# Patient Record
Sex: MALE | Race: WHITE | Employment: UNEMPLOYED | URBAN - METROPOLITAN AREA
[De-identification: names, ages, dates, MRNs, and addresses within clinical notes are randomized per-mention and may not be internally consistent; named-entity substitution may affect disease eponyms.]

---

## 2017-01-11 ENCOUNTER — TELEPHONE (OUTPATIENT)
Dept: FAMILY MEDICINE CLINIC | Facility: CLINIC | Age: 27
End: 2017-01-11

## 2017-01-11 NOTE — TELEPHONE ENCOUNTER
Pharmacy states Pt is requesting prior authorization to re-fill medication Xanax. Pt states cat spilled pills, while in the bathroom. Please advise.        Current outpatient prescriptions:   •  alprazolam 1 MG Oral Tab, Take one tablet (1mg) by mouth ever

## 2017-01-12 NOTE — TELEPHONE ENCOUNTER
Message noted and if they will not replace script then pt will have to wait until next refill. OK by me if pharmacy will allow for early refill.

## 2017-01-12 NOTE — TELEPHONE ENCOUNTER
Please advise on the patient request. [facility] does replace medications if they were stolen but there must be a police report to send to the insurance company.

## 2017-01-17 ENCOUNTER — OFFICE VISIT (OUTPATIENT)
Dept: FAMILY MEDICINE CLINIC | Facility: CLINIC | Age: 27
End: 2017-01-17

## 2017-01-17 ENCOUNTER — TELEPHONE (OUTPATIENT)
Dept: FAMILY MEDICINE CLINIC | Facility: CLINIC | Age: 27
End: 2017-01-17

## 2017-01-17 VITALS
HEIGHT: 76 IN | TEMPERATURE: 98 F | WEIGHT: 169 LBS | RESPIRATION RATE: 20 BRPM | DIASTOLIC BLOOD PRESSURE: 70 MMHG | BODY MASS INDEX: 20.58 KG/M2 | HEART RATE: 94 BPM | SYSTOLIC BLOOD PRESSURE: 106 MMHG

## 2017-01-17 DIAGNOSIS — M54.50 DORSALGIA OF LUMBAR REGION: ICD-10-CM

## 2017-01-17 DIAGNOSIS — S39.92XA BACK INJURY, INITIAL ENCOUNTER: ICD-10-CM

## 2017-01-17 PROCEDURE — 99213 OFFICE O/P EST LOW 20 MIN: CPT | Performed by: FAMILY MEDICINE

## 2017-01-17 PROCEDURE — 99212 OFFICE O/P EST SF 10 MIN: CPT | Performed by: FAMILY MEDICINE

## 2017-01-17 NOTE — PROGRESS NOTES
HPI:    Patient ID: Tuyet Richards is a 32year old male. HPI Comments: Pt presents with an injury to his right lower back after he slipped and fell. Pt states this has aggravated his previous back issues.  Pt had been seeing Dr Sowmya Mike for his previ Visit:  No prescriptions requested or ordered in this encounter    Imaging & Referrals:  MRI SPINE LUMBAR (CPT=[id number])       SE#[id number]

## 2017-01-17 NOTE — TELEPHONE ENCOUNTER
Pt's mother would like to discuss order for MRI. Pt has appt 02/10.  PT's mother stts there is suppose to be a 10 day approval?  Please advise

## 2017-01-19 ENCOUNTER — TELEPHONE (OUTPATIENT)
Dept: FAMILY MEDICINE CLINIC | Facility: CLINIC | Age: 27
End: 2017-01-19

## 2017-01-19 NOTE — TELEPHONE ENCOUNTER
Message noted and will forward question to managed care about window for MRI approval and also to see if this was approved by insurance

## 2017-01-19 NOTE — TELEPHONE ENCOUNTER
Pt's mom is asking if Pt can be prescribed more medication. Pt's mom  states he is taking about 8 tablets per day due to pain.  Please advise if ok per      Current outpatient prescriptions:   •  TRAMADOL HCL 50 MG Oral Tab, TAKE 1 TO 2 TABLETS BY MOUT

## 2017-01-19 NOTE — TELEPHONE ENCOUNTER
Noted waiting on approval for [facility] care pt notified.   No further action needed

## 2017-01-20 NOTE — TELEPHONE ENCOUNTER
Spoke with pt, he has not had medication refilled. Verified with pharmacy, they filled rx from 12/05 on 12/22, did not receive rx from 12 27. Rx called to pharmacy as written with 2 additional refills.

## 2017-01-20 NOTE — TELEPHONE ENCOUNTER
Did pt get both refills? Rx 12/27 was for 60 with 2 rf. LMTCB, may transfer to [address] [place] [address] today or FM triage.

## 2017-01-21 RX ORDER — TRAMADOL HYDROCHLORIDE 50 MG/1
TABLET ORAL
Qty: 60 TABLET | Refills: 2 | OUTPATIENT
Start: 2017-01-21

## 2017-01-21 NOTE — TELEPHONE ENCOUNTER
Per other 1/19 encounter; still had refill left on 12/27 rx for Tramadol. Please advise regarding pended refill request for cyclobenzaprine, as unable to refill per protocol.  LF=12/27/16 #30    Refill Protocol Appointment Criteria  · Appointment scheduled

## 2017-01-23 ENCOUNTER — TELEPHONE (OUTPATIENT)
Dept: FAMILY MEDICINE CLINIC | Facility: CLINIC | Age: 27
End: 2017-01-23

## 2017-01-23 RX ORDER — CYCLOBENZAPRINE HCL 10 MG
10 TABLET ORAL NIGHTLY
Qty: 30 TABLET | Refills: 0 | Status: SHIPPED | OUTPATIENT
Start: 2017-01-23 | End: 2017-02-03

## 2017-01-23 NOTE — TELEPHONE ENCOUNTER
Message noted: Chart reviewed and may refill medication as requested times one. Prescription sent to listed pharmacy. Please notify patient.

## 2017-01-23 NOTE — TELEPHONE ENCOUNTER
MRI SPINE LUMBAR  Is scheduled on 1/25 at [facility].   Please fax order to [address]: Rebeka Terry

## 2017-01-23 NOTE — TELEPHONE ENCOUNTER
Pt's mother has questions regarding the pt's Referral for MRI of back. She is questions location where pt. Should be getting test done?

## 2017-01-28 ENCOUNTER — TELEPHONE (OUTPATIENT)
Dept: FAMILY MEDICINE CLINIC | Facility: CLINIC | Age: 27
End: 2017-01-28

## 2017-01-28 DIAGNOSIS — M51.26 LUMBAR HERNIATED DISC: Primary | ICD-10-CM

## 2017-01-28 NOTE — TELEPHONE ENCOUNTER
MRI lumbar area from [facility] MRI shows some herniated disks on a few levels: Patient contacted. Discussed results and after discussion, to follow up with specialist as discussed;  Information provided and referral generated and copy of results, referra

## 2017-02-01 NOTE — TELEPHONE ENCOUNTER
Copy no longer available in clinic, sent to scan. Report requested from [facility] to be faxed to number below.

## 2017-02-01 NOTE — TELEPHONE ENCOUNTER
Pt mother is calling want to know if a copy of the result can fax to Dr Mary Rodriguez (neuro) [phone]

## 2017-02-02 ENCOUNTER — TELEPHONE (OUTPATIENT)
Dept: NEUROLOGY | Facility: CLINIC | Age: 27
End: 2017-02-02

## 2017-02-02 NOTE — TELEPHONE ENCOUNTER
Spoke to patient and informed him he will need to be re-evaluated in the office. LOV 9/13/16, PT order placed at that time. Patient never went to PT. Patient had Lumbar Spine MRI at [facility] and informed to bring the disc to the follow up appt.   P

## 2017-02-03 NOTE — TELEPHONE ENCOUNTER
Patient requesting refill for Cyclobenzaprine 10 mg nightly and Tramadol 50 mg every 6 hrs as needed for pain. Last refilled on 01/23/17 for 30 tablets / 0 refills ( for Cyclobenzaprine ) and 12/27/16 for 60 tablets / 2 refills ( for Tramadol ).  Please adv

## 2017-02-04 RX ORDER — CYCLOBENZAPRINE HCL 10 MG
TABLET ORAL
Qty: 30 TABLET | Refills: 0 | Status: SHIPPED | OUTPATIENT
Start: 2017-02-04 | End: 2017-07-03

## 2017-02-04 RX ORDER — TRAMADOL HYDROCHLORIDE 50 MG/1
TABLET ORAL
Qty: 60 TABLET | Refills: 2 | OUTPATIENT
Start: 2017-02-04 | End: 2017-02-06

## 2017-02-04 NOTE — TELEPHONE ENCOUNTER
Message noted. Chart reviewed. May refill medications as requested. Script reviewed and signed. Please call tramadol  into pharmacy as this is controlled substance and notify patient. Thanks.

## 2017-02-06 ENCOUNTER — TELEPHONE (OUTPATIENT)
Dept: FAMILY MEDICINE CLINIC | Facility: CLINIC | Age: 27
End: 2017-02-06

## 2017-02-06 RX ORDER — TRAMADOL HYDROCHLORIDE 50 MG/1
TABLET ORAL
Qty: 60 TABLET | Refills: 2 | OUTPATIENT
Start: 2017-02-06 | End: 2017-03-02

## 2017-02-06 NOTE — TELEPHONE ENCOUNTER
Message noted. Chart reviewed. Check to see if this already faxed/ called in. May refill medication as requested if not done. Script reviewed and signed. Please call into pharmacy as this is controlled substance and notify patient. Thanks.

## 2017-02-07 NOTE — TELEPHONE ENCOUNTER
Pamula Alpers form completed by Dr. Annelise Montgomery and ready for  in  of [facility].

## 2017-02-14 ENCOUNTER — OFFICE VISIT (OUTPATIENT)
Dept: NEUROLOGY | Facility: CLINIC | Age: 27
End: 2017-02-14

## 2017-02-14 ENCOUNTER — TELEPHONE (OUTPATIENT)
Dept: NEUROLOGY | Facility: CLINIC | Age: 27
End: 2017-02-14

## 2017-02-14 VITALS
BODY MASS INDEX: 21 KG/M2 | WEIGHT: 169 LBS | SYSTOLIC BLOOD PRESSURE: 128 MMHG | HEART RATE: 83 BPM | RESPIRATION RATE: 18 BRPM | DIASTOLIC BLOOD PRESSURE: 72 MMHG

## 2017-02-14 DIAGNOSIS — M51.26 LUMBAR HERNIATED DISC: ICD-10-CM

## 2017-02-14 DIAGNOSIS — M51.9 LUMBAR DISC DISEASE: ICD-10-CM

## 2017-02-14 DIAGNOSIS — G56.02 LEFT CARPAL TUNNEL SYNDROME: ICD-10-CM

## 2017-02-14 DIAGNOSIS — M54.16 LUMBAR RADICULOPATHY: Primary | ICD-10-CM

## 2017-02-14 DIAGNOSIS — G89.29 CHRONIC RIGHT-SIDED LOW BACK PAIN WITH RIGHT-SIDED SCIATICA: ICD-10-CM

## 2017-02-14 DIAGNOSIS — M54.41 CHRONIC RIGHT-SIDED LOW BACK PAIN WITH RIGHT-SIDED SCIATICA: ICD-10-CM

## 2017-02-14 PROCEDURE — 99214 OFFICE O/P EST MOD 30 MIN: CPT | Performed by: PHYSICAL MEDICINE & REHABILITATION

## 2017-02-14 NOTE — PROGRESS NOTES
Low Back Pain H & P    Chief Complaint: Patient presents with:  Low Back Pain: pt here for follow up with continued lower back pain that is worse at night after working all day.  low back pain radiates down the right leg with numbness and tingling in the ri History       Past Surgical History    ADENOIDECTOMY      TONSILLECTOMY      OTHER SURGICAL HISTORY      MUSCULOSKELETAL SURGERY UNLISTED      Comment Meniscus repair       Family History   Family History   Problem Relation Age of Onset   • Asthma Mother downward response   Reflexes: 2+ in bilateral lower extremities     Neural Tension Tests Lumbar Spine:  Sitting straight leg raise-RIGHT Positive for right posterior leg pain and at 70 degrees   Sitting straight leg raise-LEFT Negative for pain     Neurolo

## 2017-02-14 NOTE — PATIENT INSTRUCTIONS
Plan  He will drop off the CD of the MRI scan so that I can review it better. He will start PT on the lumbar spine. The patient will continue with their current pain medications.     He will get bilateral wrist cock up wrist splints to help with the

## 2017-02-17 ENCOUNTER — TELEPHONE (OUTPATIENT)
Dept: NEUROLOGY | Facility: CLINIC | Age: 27
End: 2017-02-17

## 2017-02-17 NOTE — TELEPHONE ENCOUNTER
Received disc of Lumbar Spine MRI done 1/25/17 at [address] [place] [address] report  Disc loaded to [facility] PACs and placed at the  for return to patient

## 2017-02-22 PROBLEM — M43.10 RETROLISTHESIS: Status: ACTIVE | Noted: 2017-02-22

## 2017-02-22 PROBLEM — M51.9 LUMBAR DISC DISEASE: Status: ACTIVE | Noted: 2017-02-22

## 2017-02-22 PROBLEM — M51.9 LUMBAR DISC DISEASE: Status: RESOLVED | Noted: 2017-02-14 | Resolved: 2017-02-22

## 2017-02-22 NOTE — TELEPHONE ENCOUNTER
He has a moderate herniated disc at L5-S1 on the right and he has some mild movement in his spine on extension views but this is probably insignificant. I would like the patient to call me after he has done 3 weeks of the PT.   If he is not doing better, t

## 2017-02-24 NOTE — TELEPHONE ENCOUNTER
Detailed message left on mobile phone with Dr Hughes Nurse recommendation and interpretation of MRI. Pt was asked to call after 3 week of physical therapy with update to progress. Pt told to call if any questions.

## 2017-03-03 NOTE — TELEPHONE ENCOUNTER
Pt's mom is asking to speak to nurse manager in regards situation. Pt's mom states pharmacy told her they had faxed request since last week.  Please advise

## 2017-03-03 NOTE — TELEPHONE ENCOUNTER
Spoke with pt and informed of 48-72 business hour refill policy and that Dr Oliverio Dunlap will not be in to address this until Monday (request came form pharmacy yesterday).  Pt reports this was requested a week ago by his mother os this is unacceptable--no such enco

## 2017-03-03 NOTE — TELEPHONE ENCOUNTER
Mother/Mayuri [phone] is calling for status of medication refill request. Per mother pt is out of medication.

## 2017-03-03 NOTE — TELEPHONE ENCOUNTER
Medication pending for review. If approved, please route to staff to call med in to pharmacy; or fax.     Last Rx: 2/6/17 #60 & 2-R ([facility]; informed last = 2/23/17 #60)    Recent Visits       Provider Department [facility]

## 2017-03-04 RX ORDER — TRAMADOL HYDROCHLORIDE 50 MG/1
TABLET ORAL
Qty: 60 TABLET | Refills: 2 | OUTPATIENT
Start: 2017-03-04 | End: 2017-03-21

## 2017-03-04 NOTE — TELEPHONE ENCOUNTER
Tramadol Rx phoned into the [facility] pharmacy pharmacist line.  Detailed VM left with Rx and instructions, Office number and Dr. Mariah Smith TOMASA#

## 2017-03-04 NOTE — TELEPHONE ENCOUNTER
Sarah eBth Anderson for Pae: Spoke to mother who is upset. Patient is completely out of medication for Tramadol. Patient mother asked if you can please give one refill or a short supply if possible.      Patient c/o LUMBAR spine; LOV with DR Cummins 2/14/17;  SNOW campuzano

## 2017-03-04 NOTE — TELEPHONE ENCOUNTER
Pt contacted and informed that the Tramadol Rx was phoned in to the pharmacy  this morning. Pt verbalized understanding and denies any further questions or concerns at present.

## 2017-03-09 RX ORDER — ALPRAZOLAM 1 MG/1
TABLET ORAL
Qty: 60 TABLET | Refills: 1 | Status: SHIPPED
Start: 2017-03-09 | End: 2017-04-10

## 2017-03-09 NOTE — TELEPHONE ENCOUNTER
Pt notified that his Rx were fax to [facility] [place]. Delano Mew.   No further action needed

## 2017-03-10 ENCOUNTER — APPOINTMENT (OUTPATIENT)
Dept: PHYSICAL THERAPY | Facility: HOSPITAL | Age: 27
End: 2017-03-10
Attending: PHYSICAL MEDICINE & REHABILITATION
Payer: MEDICAID

## 2017-03-17 ENCOUNTER — OFFICE VISIT (OUTPATIENT)
Dept: PHYSICAL THERAPY | Facility: HOSPITAL | Age: 27
End: 2017-03-17
Attending: PHYSICAL MEDICINE & REHABILITATION
Payer: MEDICAID

## 2017-03-17 DIAGNOSIS — M54.41 CHRONIC RIGHT-SIDED LOW BACK PAIN WITH RIGHT-SIDED SCIATICA: ICD-10-CM

## 2017-03-17 DIAGNOSIS — M51.9 LUMBAR DISC DISEASE: ICD-10-CM

## 2017-03-17 DIAGNOSIS — M54.16 LUMBAR RADICULOPATHY: Primary | ICD-10-CM

## 2017-03-17 DIAGNOSIS — G89.29 CHRONIC RIGHT-SIDED LOW BACK PAIN WITH RIGHT-SIDED SCIATICA: ICD-10-CM

## 2017-03-17 DIAGNOSIS — M51.26 LUMBAR HERNIATED DISC: ICD-10-CM

## 2017-03-17 PROCEDURE — 97110 THERAPEUTIC EXERCISES: CPT

## 2017-03-17 PROCEDURE — 97162 PT EVAL MOD COMPLEX 30 MIN: CPT

## 2017-03-17 NOTE — PROGRESS NOTES
LUMBAR SPINE EVALUATION:   Referring Physician: Dr. Viann Meckel  Date of Onset: 2/14/2017 Date of Service: 3/17/2017   Lumbar Radiculopathy (M54.16); R LBP with sciatica (M54.41; G89.29); herniated disc (M51.26); disc disease (M51.9)     PATIENT SUMMARY:   Mi activities (skateboarding, riding bike). Pt goals include to relieve pain and return to work and exercise without difficulty. Past medical history was reviewed with Asa Corona. Significant findings include: B meniscectomy and repair (~ 8 years ago).  Cigar test: (+) for Adverse Neural Tension with neck flexion provoking symptoms at R lumbar; R buttock and R posterior thigh; Symptoms resolved with neck extension. Repeated Movement Testin.  Repeated lumbar flexion in standing: Increased R lumbar; Produc prophylactic care. 2. Patient will have lumbar ROM WFL to ease bending and lifting to meet occupational demands as  without difficulty. 3. Patient will have 5/5 LE strength to squat and lift at least 40 lbs. Without difficulty.   4. Patient wi

## 2017-03-22 RX ORDER — TRAMADOL HYDROCHLORIDE 50 MG/1
TABLET ORAL
Qty: 60 TABLET | Refills: 2 | Status: SHIPPED
Start: 2017-03-22 | End: 2017-04-10

## 2017-03-24 ENCOUNTER — OFFICE VISIT (OUTPATIENT)
Dept: PHYSICAL THERAPY | Facility: HOSPITAL | Age: 27
End: 2017-03-24
Attending: PHYSICAL MEDICINE & REHABILITATION
Payer: MEDICAID

## 2017-03-24 DIAGNOSIS — M54.16 LUMBAR RADICULOPATHY: Primary | ICD-10-CM

## 2017-03-24 DIAGNOSIS — M54.41 CHRONIC RIGHT-SIDED LOW BACK PAIN WITH RIGHT-SIDED SCIATICA: ICD-10-CM

## 2017-03-24 DIAGNOSIS — M51.26 LUMBAR HERNIATED DISC: ICD-10-CM

## 2017-03-24 DIAGNOSIS — M51.9 LUMBAR DISC DISEASE: ICD-10-CM

## 2017-03-24 DIAGNOSIS — G89.29 CHRONIC RIGHT-SIDED LOW BACK PAIN WITH RIGHT-SIDED SCIATICA: ICD-10-CM

## 2017-03-24 PROCEDURE — 97112 NEUROMUSCULAR REEDUCATION: CPT

## 2017-03-24 PROCEDURE — 97110 THERAPEUTIC EXERCISES: CPT

## 2017-03-24 PROCEDURE — 97140 MANUAL THERAPY 1/> REGIONS: CPT

## 2017-03-24 NOTE — PROGRESS NOTES
Dx: Lumbar Radiculopathy (M54.16); R LBP with sciatica (M54.41; G89.29); herniated disc (M51.26); disc disease (M51.9)         Authorized # of Visits:  2/7         Next MD visit: none scheduled  Fall Risk: standard         Precautions: [address]  without difficulty. 3. Patient will have 5/5 LE strength to squat and lift at least 40 lbs. Without difficulty. 4. Patient will have improved neurodynamics to look down and look up with reaching overhead without increased symptoms.   5. Shireene

## 2017-03-31 ENCOUNTER — OFFICE VISIT (OUTPATIENT)
Dept: PHYSICAL THERAPY | Facility: HOSPITAL | Age: 27
End: 2017-03-31
Attending: PHYSICAL MEDICINE & REHABILITATION
Payer: MEDICAID

## 2017-03-31 DIAGNOSIS — G89.29 CHRONIC RIGHT-SIDED LOW BACK PAIN WITH RIGHT-SIDED SCIATICA: ICD-10-CM

## 2017-03-31 DIAGNOSIS — M51.9 LUMBAR DISC DISEASE: ICD-10-CM

## 2017-03-31 DIAGNOSIS — M51.26 LUMBAR HERNIATED DISC: ICD-10-CM

## 2017-03-31 DIAGNOSIS — M54.16 LUMBAR RADICULOPATHY: Primary | ICD-10-CM

## 2017-03-31 DIAGNOSIS — M54.41 CHRONIC RIGHT-SIDED LOW BACK PAIN WITH RIGHT-SIDED SCIATICA: ICD-10-CM

## 2017-03-31 PROCEDURE — 97140 MANUAL THERAPY 1/> REGIONS: CPT

## 2017-03-31 PROCEDURE — 97110 THERAPEUTIC EXERCISES: CPT

## 2017-03-31 PROCEDURE — 97112 NEUROMUSCULAR REEDUCATION: CPT

## 2017-03-31 NOTE — PROGRESS NOTES
Dx: Lumbar Radiculopathy (M54.16); R LBP with sciatica (M54.41; G89.29); herniated disc (M51.26); disc disease (M51.9)         Authorized # of Visits:  2/7         Next MD visit: none scheduled  Fall Risk: standard         Precautions: [address] without provocation of radicular symptoms. R hip pain was produced with end range lumbar flexion in supine. R posterior thigh pain produced with end range flex in standing. No c/o pain post-tx. Goals:     Goals:  1.  Patient will be independent with HEP

## 2017-04-05 ENCOUNTER — TELEPHONE (OUTPATIENT)
Dept: ORTHOPEDICS CLINIC | Facility: CLINIC | Age: 27
End: 2017-04-05

## 2017-04-07 ENCOUNTER — OFFICE VISIT (OUTPATIENT)
Dept: PHYSICAL THERAPY | Facility: HOSPITAL | Age: 27
End: 2017-04-07
Attending: PHYSICAL MEDICINE & REHABILITATION
Payer: MEDICAID

## 2017-04-07 DIAGNOSIS — M51.9 LUMBAR DISC DISEASE: ICD-10-CM

## 2017-04-07 DIAGNOSIS — M54.16 LUMBAR RADICULOPATHY: Primary | ICD-10-CM

## 2017-04-07 DIAGNOSIS — G89.29 CHRONIC RIGHT-SIDED LOW BACK PAIN WITH RIGHT-SIDED SCIATICA: ICD-10-CM

## 2017-04-07 DIAGNOSIS — M54.41 CHRONIC RIGHT-SIDED LOW BACK PAIN WITH RIGHT-SIDED SCIATICA: ICD-10-CM

## 2017-04-07 DIAGNOSIS — M51.26 LUMBAR HERNIATED DISC: ICD-10-CM

## 2017-04-07 PROCEDURE — 97110 THERAPEUTIC EXERCISES: CPT

## 2017-04-07 PROCEDURE — 97112 NEUROMUSCULAR REEDUCATION: CPT

## 2017-04-07 PROCEDURE — 97140 MANUAL THERAPY 1/> REGIONS: CPT

## 2017-04-07 NOTE — PROGRESS NOTES
Dx: Lumbar Radiculopathy (M54.16); R LBP with sciatica (M54.41; G89.29); herniated disc (M51.26); disc disease (M51.9)         Authorized # of Visits:  4/7         Next MD visit: none scheduled  Fall Risk: standard         Precautions: [address] Supine: FMP to improve lumbar flex mobility  - Sitting: FMP to improve spinal flexion mobility x10 min  - Supine: FMP to improve lumbar flex mobility  - Supine: MFR B psoas x10 min  - Supine: FMP to improve lumbar flex mobility  - Supine: MFR B psoas  - Si

## 2017-04-11 RX ORDER — TRAMADOL HYDROCHLORIDE 50 MG/1
TABLET ORAL
Qty: 60 TABLET | Refills: 2 | Status: SHIPPED
Start: 2017-04-11 | End: 2017-05-03

## 2017-04-11 RX ORDER — ALPRAZOLAM 1 MG/1
TABLET ORAL
Qty: 60 TABLET | Refills: 2 | Status: SHIPPED
Start: 2017-04-11 | End: 2017-07-15

## 2017-04-11 NOTE — TELEPHONE ENCOUNTER
Message noted. Chart reviewed. May refill medications as requested. Script reviewed and signed/ printed. Will have nurse fax into pharmacy as this is controlled substance and notify patient.  No change in dosage and CPM. Appointment to discuss treatment if

## 2017-04-11 NOTE — TELEPHONE ENCOUNTER
Pt Rx  For Tramadol were faxed over to [facility] . Message left on pt voicemail.   No further action needed

## 2017-04-11 NOTE — TELEPHONE ENCOUNTER
Pt's mom called in to follow up on refill request.  Pt's mom states pt will be out of one of the medications on Thursday. Pt's mom states pt wants to know if he can be prescribed ALPRAZOLAM at 2mg with 30 pills?   Pt's mom states pt is asking so he can jazzmine

## 2017-04-14 ENCOUNTER — OFFICE VISIT (OUTPATIENT)
Dept: PHYSICAL THERAPY | Facility: HOSPITAL | Age: 27
End: 2017-04-14
Attending: PHYSICAL MEDICINE & REHABILITATION
Payer: MEDICAID

## 2017-04-14 DIAGNOSIS — M54.16 LUMBAR RADICULOPATHY: Primary | ICD-10-CM

## 2017-04-14 DIAGNOSIS — M51.26 LUMBAR HERNIATED DISC: ICD-10-CM

## 2017-04-14 DIAGNOSIS — M54.41 CHRONIC RIGHT-SIDED LOW BACK PAIN WITH RIGHT-SIDED SCIATICA: ICD-10-CM

## 2017-04-14 DIAGNOSIS — M51.9 LUMBAR DISC DISEASE: ICD-10-CM

## 2017-04-14 DIAGNOSIS — G89.29 CHRONIC RIGHT-SIDED LOW BACK PAIN WITH RIGHT-SIDED SCIATICA: ICD-10-CM

## 2017-04-14 PROCEDURE — 97110 THERAPEUTIC EXERCISES: CPT

## 2017-04-14 PROCEDURE — 97140 MANUAL THERAPY 1/> REGIONS: CPT

## 2017-04-14 NOTE — PROGRESS NOTES
Dx: Lumbar Radiculopathy (M54.16); R LBP with sciatica (M54.41; G89.29); herniated disc (M51.26); disc disease (M51.9)         Authorized # of Visits:  5/7         Next MD visit: none scheduled  Fall Risk: standard         Precautions: [address] MFR B psoas  - Sidelying: lumbosacral manip at R  - Thoracic manip  - Sitting: FMP to improve lumbar flexion   Neuro Re-ed X 15 min  - Supine: Diaphragmatic MFR and activation  - Supine: Diphragmatic breathing      Assessment: Following manual therapy, pt

## 2017-04-21 ENCOUNTER — OFFICE VISIT (OUTPATIENT)
Dept: PHYSICAL THERAPY | Facility: HOSPITAL | Age: 27
End: 2017-04-21
Attending: PHYSICAL MEDICINE & REHABILITATION
Payer: MEDICAID

## 2017-04-21 DIAGNOSIS — M54.41 CHRONIC RIGHT-SIDED LOW BACK PAIN WITH RIGHT-SIDED SCIATICA: ICD-10-CM

## 2017-04-21 DIAGNOSIS — M54.16 LUMBAR RADICULOPATHY: Primary | ICD-10-CM

## 2017-04-21 DIAGNOSIS — M51.26 LUMBAR HERNIATED DISC: ICD-10-CM

## 2017-04-21 DIAGNOSIS — G89.29 CHRONIC RIGHT-SIDED LOW BACK PAIN WITH RIGHT-SIDED SCIATICA: ICD-10-CM

## 2017-04-21 DIAGNOSIS — M51.9 LUMBAR DISC DISEASE: ICD-10-CM

## 2017-04-21 PROCEDURE — 97110 THERAPEUTIC EXERCISES: CPT

## 2017-04-21 PROCEDURE — 97140 MANUAL THERAPY 1/> REGIONS: CPT

## 2017-04-21 NOTE — PROGRESS NOTES
Dx: Lumbar Radiculopathy (M54.16); R LBP with sciatica (M54.41; G89.29); herniated disc (M51.26); disc disease (M51.9)         Authorized # of Visits:  6/7         Next MD visit: none scheduled  Fall Risk: standard         Precautions: [address] followed by 2-3 lumbar ext; Added standing: iliopsoas lift   Therapeutic Exercise X 20 min  - Repeated lumbar ext in prone: 10x1  - Repeated lumbar ext in standing: 10x1  - Supine: Sciatic n.  Neuromobilization; supine and sitting; 10x1, each position  - Mitchell better to repeated lumbar flexion to reduce symptoms and increase lumbar ROM. Therefore, HEP was modified to repeated lumbar flexion in sitting 10x every 2-3 hours, followed by lumbar ext in standing x3. He was agreeable. Goals:     Goals:  1.  Patient

## 2017-04-28 ENCOUNTER — OFFICE VISIT (OUTPATIENT)
Dept: PHYSICAL THERAPY | Facility: HOSPITAL | Age: 27
End: 2017-04-28
Attending: PHYSICAL MEDICINE & REHABILITATION
Payer: MEDICAID

## 2017-04-28 PROCEDURE — 97110 THERAPEUTIC EXERCISES: CPT

## 2017-04-28 PROCEDURE — 97140 MANUAL THERAPY 1/> REGIONS: CPT

## 2017-05-03 RX ORDER — TRAMADOL HYDROCHLORIDE 50 MG/1
TABLET ORAL
Qty: 60 TABLET | Refills: 2 | Status: SHIPPED
Start: 2017-05-03 | End: 2017-05-22

## 2017-05-04 NOTE — TELEPHONE ENCOUNTER
[facility] requesting to speak to nurse to clarify rx. Idalia Bowles states pt has refilled this med every few days, and is upset to be out of meds. Please advise, requesting clarification.

## 2017-05-05 ENCOUNTER — APPOINTMENT (OUTPATIENT)
Dept: PHYSICAL THERAPY | Facility: HOSPITAL | Age: 27
End: 2017-05-05
Attending: PHYSICAL MEDICINE & REHABILITATION
Payer: MEDICAID

## 2017-05-12 ENCOUNTER — OFFICE VISIT (OUTPATIENT)
Dept: PHYSICAL THERAPY | Facility: HOSPITAL | Age: 27
End: 2017-05-12
Attending: PHYSICAL MEDICINE & REHABILITATION
Payer: MEDICAID

## 2017-05-12 PROCEDURE — 97140 MANUAL THERAPY 1/> REGIONS: CPT

## 2017-05-12 PROCEDURE — 97110 THERAPEUTIC EXERCISES: CPT

## 2017-05-12 NOTE — PROGRESS NOTES
Patient Name: Maria Del Rosario Thakkar  YOB: 1990          MRN number:  [id number]  Date:  4/28/2017  Referring Physician:  Jalil Dey  Dx: Lumbar Radiculopathy (M54.16); R LBP with sciatica (M54.41; G89.29); herniated disc (M51.26); disc dise lumbosacral manip at R  - Sitting: FMP to improve lumbar flexion x10 min  - Supine: FMP to improve lumbar flex mobility  - Sidelying: lumbosacral manip at R  - Sitting: FMP to improve lumbar flexion  - Supine: mid-thoracic manip   Neuro Re-ed         Assnenita

## 2017-05-19 ENCOUNTER — OFFICE VISIT (OUTPATIENT)
Dept: PHYSICAL THERAPY | Facility: HOSPITAL | Age: 27
End: 2017-05-19
Attending: PHYSICAL MEDICINE & REHABILITATION
Payer: MEDICAID

## 2017-05-19 PROCEDURE — 97110 THERAPEUTIC EXERCISES: CPT

## 2017-05-19 PROCEDURE — 97140 MANUAL THERAPY 1/> REGIONS: CPT

## 2017-05-22 RX ORDER — TRAMADOL HYDROCHLORIDE 50 MG/1
TABLET ORAL
Qty: 60 TABLET | Refills: 2 | Status: SHIPPED
Start: 2017-05-22 | End: 2017-06-13

## 2017-05-26 ENCOUNTER — OFFICE VISIT (OUTPATIENT)
Dept: PHYSICAL THERAPY | Facility: HOSPITAL | Age: 27
End: 2017-05-26
Attending: PHYSICAL MEDICINE & REHABILITATION
Payer: MEDICAID

## 2017-05-26 PROCEDURE — 97140 MANUAL THERAPY 1/> REGIONS: CPT

## 2017-05-26 PROCEDURE — 97110 THERAPEUTIC EXERCISES: CPT

## 2017-05-26 NOTE — PROGRESS NOTES
Patient Name: Chun Elizabeth  YOB: 1990          MRN number:  [id number]  Date:  4/28/2017  Referring Physician:  Saundra Rodriguez  Dx: Lumbar Radiculopathy (M54.16); R LBP with sciatica (M54.41; G89.29); herniated disc (M51.26); disc dise FMP to improve lumbar flexion  - Prone: mid-thoracic P/A mobilization x15 minutes  - Sidelying SIJ manip at R  - Supine: FMP to improve lumbar flex mobility  - Sitting: FMP to improve lumbar flexion  - Prone: mid-thoracic/lumbar P/A mobilization   Neuro Re

## 2017-06-02 ENCOUNTER — APPOINTMENT (OUTPATIENT)
Dept: PHYSICAL THERAPY | Facility: HOSPITAL | Age: 27
End: 2017-06-02
Payer: MEDICAID

## 2017-06-08 ENCOUNTER — TELEPHONE (OUTPATIENT)
Dept: NEUROLOGY | Facility: CLINIC | Age: 27
End: 2017-06-08

## 2017-06-09 ENCOUNTER — OFFICE VISIT (OUTPATIENT)
Dept: PHYSICAL THERAPY | Facility: HOSPITAL | Age: 27
End: 2017-06-09
Attending: PHYSICAL MEDICINE & REHABILITATION
Payer: MEDICAID

## 2017-06-09 PROCEDURE — 97140 MANUAL THERAPY 1/> REGIONS: CPT

## 2017-06-09 PROCEDURE — 97110 THERAPEUTIC EXERCISES: CPT

## 2017-06-13 ENCOUNTER — TELEPHONE (OUTPATIENT)
Dept: FAMILY MEDICINE CLINIC | Facility: CLINIC | Age: 27
End: 2017-06-13

## 2017-06-13 RX ORDER — TRAMADOL HYDROCHLORIDE 50 MG/1
TABLET ORAL
Qty: 60 TABLET | Refills: 2 | Status: SHIPPED
Start: 2017-06-13 | End: 2017-06-27

## 2017-06-13 NOTE — TELEPHONE ENCOUNTER
Pt mother calling regarding refill request for TRAMADOL HCL 50 MG Oral Tab. .. please advise       Current Outpatient Prescriptions:  TRAMADOL HCL 50 MG Oral Tab TAKE 1 TO 2 TABLETS BY MOUTH EVERY 6 HOURS AS NEEDED FOR PAIN Disp: 60 tablet Rfl: 2

## 2017-06-13 NOTE — TELEPHONE ENCOUNTER
Message noted. Chart reviewed. May refill medication as requested. Script reviewed and signed/ printed. Given to nurse fax into pharmacy as this is controlled substance and notify patient.

## 2017-06-16 ENCOUNTER — APPOINTMENT (OUTPATIENT)
Dept: PHYSICAL THERAPY | Facility: HOSPITAL | Age: 27
End: 2017-06-16
Payer: MEDICAID

## 2017-06-23 ENCOUNTER — OFFICE VISIT (OUTPATIENT)
Dept: PHYSICAL THERAPY | Facility: HOSPITAL | Age: 27
End: 2017-06-23
Attending: PHYSICAL MEDICINE & REHABILITATION
Payer: MEDICAID

## 2017-06-23 PROCEDURE — 97140 MANUAL THERAPY 1/> REGIONS: CPT

## 2017-06-23 PROCEDURE — 97110 THERAPEUTIC EXERCISES: CPT

## 2017-06-23 NOTE — PROGRESS NOTES
Patient Name: Yulia Prabhakar  YOB: 1990          MRN number:  [id number]  Date:  6/23/2017  Referring Physician:  Gume Snow  Dx: Lumbar Radiculopathy (M54.16); R LBP with sciatica (M54.41; G89.29); herniated disc (M51.26); disc dis L=5/5   L3 - Knee Ext  R=3+/5, L=5/5  R=5/5, L=5/5   L4 - Ankle DF  R=4/5, L=5/5  R=5/5, L=5/5   L5 - EHL  R=5/5, L=5/5   R=5/5, L=5/5   S1 - Ankle PF  R=5/5, L=5/5   R=5/5, L=5/5     Hip Strength: 6/23/2017  Psoas: R= 4-/5, L=4/5  Post glut met: R =5/5, L treatment options and has agreed to actively participate in planning and for this course of care. Thank you for your referral. Please co-sign or sign and return this letter via fax as soon as possible to [phone].  If you have any questions, please c

## 2017-06-27 ENCOUNTER — TELEPHONE (OUTPATIENT)
Dept: FAMILY MEDICINE CLINIC | Facility: CLINIC | Age: 27
End: 2017-06-27

## 2017-06-27 RX ORDER — TRAMADOL HYDROCHLORIDE 50 MG/1
TABLET ORAL
Qty: 60 TABLET | Refills: 2 | OUTPATIENT
Start: 2017-06-27 | End: 2017-07-03

## 2017-06-27 NOTE — TELEPHONE ENCOUNTER
Patient called and stated need a new refill request  Patient is currently out of medication-      Current Outpatient Prescriptions:  TraMADol HCl 50 MG Oral Tab TAKE 1 TABLET BY MOUTH EVERY 6 HOURS AS NEEDED FOR PAIN Disp: 60 tablet Rfl: 2     [facility]

## 2017-06-27 NOTE — TELEPHONE ENCOUNTER
Mother is following up on medication advised to have pt call us back regarding direction of medication. ..,please advise

## 2017-06-27 NOTE — TELEPHONE ENCOUNTER
Pt was inform of  message below and he verbalized understanding. Pharmacy was called and given the new tramadol sig.

## 2017-06-27 NOTE — TELEPHONE ENCOUNTER
Mother states that the script for the tramadol medication that was sent to the pharmacy was written wrong. Per mother the script was written for 60 pills which would only last the pt 15 days.  Mother states that pt takes 2 pills every 6 hours not 1 like it

## 2017-06-27 NOTE — TELEPHONE ENCOUNTER
Message noted. May change sig to 1-2 tablets every 6 hours as needed for pain. May call into pharmacy. Encouraged patient to take 1 tablet.

## 2017-06-27 NOTE — TELEPHONE ENCOUNTER
Pt calling checking status of change in rx, pt states quantity for tramadol was incorrect, pt states rx should have been for 1 week but stated two. Pt states he is out of  meds now, please advise.

## 2017-06-27 NOTE — TELEPHONE ENCOUNTER
Pt called back again and spoke with nurse. States rx should be for 2 pills q 6 hrs and pharmacy will not release refill to him. NP please advise if you wish to change sig. Pended for signature.

## 2017-06-30 ENCOUNTER — APPOINTMENT (OUTPATIENT)
Dept: PHYSICAL THERAPY | Facility: HOSPITAL | Age: 27
End: 2017-06-30
Payer: MEDICAID

## 2017-07-03 ENCOUNTER — OFFICE VISIT (OUTPATIENT)
Dept: FAMILY MEDICINE CLINIC | Facility: CLINIC | Age: 27
End: 2017-07-03

## 2017-07-03 VITALS
DIASTOLIC BLOOD PRESSURE: 71 MMHG | SYSTOLIC BLOOD PRESSURE: 114 MMHG | HEIGHT: 76 IN | TEMPERATURE: 98 F | WEIGHT: 164 LBS | BODY MASS INDEX: 19.97 KG/M2 | HEART RATE: 91 BPM

## 2017-07-03 DIAGNOSIS — M54.50 DORSALGIA OF LUMBAR REGION: ICD-10-CM

## 2017-07-03 PROCEDURE — 99212 OFFICE O/P EST SF 10 MIN: CPT | Performed by: FAMILY MEDICINE

## 2017-07-03 PROCEDURE — 99213 OFFICE O/P EST LOW 20 MIN: CPT | Performed by: FAMILY MEDICINE

## 2017-07-03 RX ORDER — TRAMADOL HYDROCHLORIDE 50 MG/1
TABLET ORAL
Qty: 120 TABLET | Refills: 0 | Status: SHIPPED | OUTPATIENT
Start: 2017-07-03 | End: 2017-07-28

## 2017-07-03 NOTE — PROGRESS NOTES
HPI:    Patient ID: Beba Richards is a 32year old male. Patient is here for follow up for chronic medical issues- chronic back pain. The patient is compliant with medications and no side effects.  There are no acute issues and patient is requesti

## 2017-07-17 RX ORDER — ALPRAZOLAM 1 MG/1
TABLET ORAL
Qty: 60 TABLET | Refills: 2 | Status: SHIPPED
Start: 2017-07-17 | End: 2017-07-18

## 2017-07-18 RX ORDER — ALPRAZOLAM 1 MG/1
TABLET ORAL
Qty: 60 TABLET | Refills: 2 | Status: SHIPPED
Start: 2017-07-18 | End: 2017-10-11

## 2017-07-27 NOTE — TELEPHONE ENCOUNTER
Mom calling on status of rxs for pt      also requesting refill for Tramadol  Needs by Tuesday    Pt can be reached at ph# [phone]

## 2017-07-31 RX ORDER — TRAMADOL HYDROCHLORIDE 50 MG/1
TABLET ORAL
Qty: 60 TABLET | Refills: 2 | OUTPATIENT
Start: 2017-07-31 | End: 2017-08-02

## 2017-07-31 RX ORDER — CYCLOBENZAPRINE HCL 10 MG
TABLET ORAL
Qty: 30 TABLET | Refills: 0 | Status: SHIPPED | OUTPATIENT
Start: 2017-07-31 | End: 2017-09-24

## 2017-08-01 RX ORDER — TRAMADOL HYDROCHLORIDE 50 MG/1
TABLET ORAL
Qty: 60 TABLET | Refills: 2 | OUTPATIENT
Start: 2017-08-01

## 2017-08-01 NOTE — TELEPHONE ENCOUNTER
Tramadol called into the [facility] pharmacy in [address]  as ordered on 7/31/1/7.

## 2017-08-01 NOTE — TELEPHONE ENCOUNTER
CYCLOBENZAPRINE HCL 10 MG Oral Tab called into the [facility] pharmacy in [address] as ordered on 7/31/17.

## 2017-08-02 RX ORDER — TRAMADOL HYDROCHLORIDE 50 MG/1
TABLET ORAL
Qty: 60 TABLET | Refills: 2 | Status: SHIPPED
Start: 2017-08-02 | End: 2017-08-30

## 2017-08-04 NOTE — TELEPHONE ENCOUNTER
Verified w/ pharmacy Rx for Tramadol was already called in.  Cyclobenazprine was not called into, called in w/ pharmacist.

## 2017-08-31 RX ORDER — TRAMADOL HYDROCHLORIDE 50 MG/1
TABLET ORAL
Qty: 60 TABLET | Refills: 2 | Status: SHIPPED
Start: 2017-08-31 | End: 2017-09-19

## 2017-09-20 RX ORDER — TRAMADOL HYDROCHLORIDE 50 MG/1
TABLET ORAL
Qty: 60 TABLET | Refills: 2 | Status: SHIPPED
Start: 2017-09-20 | End: 2017-10-10

## 2017-09-25 RX ORDER — CYCLOBENZAPRINE HCL 10 MG
TABLET ORAL
Qty: 30 TABLET | Refills: 0 | Status: SHIPPED | OUTPATIENT
Start: 2017-09-25 | End: 2017-10-23

## 2017-09-25 NOTE — TELEPHONE ENCOUNTER
Message noted: Chart reviewed and may refill medication as requested. Prescription sent to listed pharmacy. Pharmacy to notify patient.

## 2017-09-27 ENCOUNTER — TELEPHONE (OUTPATIENT)
Dept: OTHER | Age: 27
End: 2017-09-27

## 2017-09-27 NOTE — TELEPHONE ENCOUNTER
Can follow up once insurance is settled but will need to find a new provider in [facility] until then.

## 2017-09-27 NOTE — TELEPHONE ENCOUNTER
LMB. Transfer to [phone]. Dr. Karma Rosas, this patient is [facility] and we won't be able to make appt for him after Oct 1. Please advise.

## 2017-09-27 NOTE — TELEPHONE ENCOUNTER
Spoke with [facility] pharmacist about concerns. Will have office contact pt to make follow up appointment to reevaluate need for medications soon.

## 2017-09-27 NOTE — TELEPHONE ENCOUNTER
Osborn Duverney [facility] pharmacist would like a call back from Dr Joyce Roblero only. States that has some concerns about patient's mother and patient's tramadol. Thinks mother might be abusing patient's medication.

## 2017-10-11 RX ORDER — TRAMADOL HYDROCHLORIDE 50 MG/1
TABLET ORAL
Qty: 60 TABLET | Refills: 2 | OUTPATIENT
Start: 2017-10-11 | End: 2017-10-11

## 2017-10-11 RX ORDER — TRAMADOL HYDROCHLORIDE 50 MG/1
TABLET ORAL
Qty: 60 TABLET | Refills: 2 | Status: SHIPPED
Start: 2017-10-11 | End: 2017-11-07

## 2017-10-12 RX ORDER — ALPRAZOLAM 1 MG/1
TABLET ORAL
Qty: 60 TABLET | Refills: 2 | Status: SHIPPED
Start: 2017-10-12 | End: 2018-01-02

## 2017-10-17 RX ORDER — TRAMADOL HYDROCHLORIDE 50 MG/1
TABLET ORAL
Qty: 60 TABLET | Refills: 2 | OUTPATIENT
Start: 2017-10-17

## 2017-10-24 RX ORDER — CYCLOBENZAPRINE HCL 10 MG
TABLET ORAL
Qty: 30 TABLET | Refills: 0 | Status: SHIPPED | OUTPATIENT
Start: 2017-10-24 | End: 2017-11-19

## 2017-11-07 NOTE — TELEPHONE ENCOUNTER
Please advise on refill request.    Recent Outpatient Visits            4 months ago Dorsalgia of lumbar region    Blanca Medina MD    Office Visit    4 months ago     [facility]

## 2017-11-08 RX ORDER — TRAMADOL HYDROCHLORIDE 50 MG/1
TABLET ORAL
Qty: 60 TABLET | Refills: 0 | Status: SHIPPED
Start: 2017-11-08 | End: 2017-11-21

## 2017-11-08 NOTE — TELEPHONE ENCOUNTER
[facility] [place] [address] [place], [address] ~ [phone]                Discharge Summary   5/12/2017    Zach Desai           Admission Information        Provider Department    5/12/2017 Rios Cleary MD  1sw-J Message noted. Chart reviewed. May refill medication as requested. Script reviewed and signed/ printed.  Will have nurse fax into pharmacy as this is controlled substance and notify patient to make follow up appointment [facility] [address]  [phone]          Follow up with Julia Manzo MD. Call on 5/15/2017.     Specialties:  Natividad Heck, Internal Medicine    Contact information:    Lexi Sylvester [address] -- (05/12/17)  104 (H) (05/12/17)  7 (L) (05/12/17)  0.52 (L) (05/12/17)  9.3 (05/12/17)  127 (H) (05/12/17)  15      Metabolic Lab Results  (Last result in the past 90 days)    ALT Bilirubin,Total Total Protein Albumin Sodium Potassium Chloride    (05/12 In the next few days, a nurse may call you to see how you and baby are doing. In the next few weeks you may be mailed a survey from us asking you to rate your experience here at [facility].   We value our patient's feedback, and would appreciate you taking a m

## 2017-11-08 NOTE — TELEPHONE ENCOUNTER
Patient notified that his/her Rx were faxed to pharmacy. Patient still have [employer] and was informed that we do not accept the insurance anymore.   Patient was told that he needed to make appointment after he switch insurance and before his refill reques

## 2017-11-20 ENCOUNTER — TELEPHONE (OUTPATIENT)
Dept: FAMILY MEDICINE CLINIC | Facility: CLINIC | Age: 27
End: 2017-11-20

## 2017-11-20 RX ORDER — TRAMADOL HYDROCHLORIDE 50 MG/1
TABLET ORAL
Qty: 60 TABLET | Refills: 0 | Status: CANCELLED | OUTPATIENT
Start: 2017-11-20

## 2017-11-20 NOTE — TELEPHONE ENCOUNTER
Pt mother called states pt is out of tramadol   Requesting with refills  Mother requesting call back when ready

## 2017-11-20 NOTE — TELEPHONE ENCOUNTER
Pt needs refill for     TRAMADOL HCL 50 MG Oral Tab TAKE 1 OR 2 TABLETS BY MOUTH EVERY 6 HOURS AS NEEDED FOR PAIN Disp: 60 tablet Rfl: 0   ALPRAZOLAM 1 MG Oral Tab TAKE 1 TABLET BY MOUTH TWICE A DAY AS NEEDED FOR ANXIETY Disp: 60 tablet Rfl: 2     Pt will

## 2017-11-21 RX ORDER — CYCLOBENZAPRINE HCL 10 MG
TABLET ORAL
Qty: 30 TABLET | Refills: 0 | Status: SHIPPED | OUTPATIENT
Start: 2017-11-21 | End: 2019-05-24

## 2017-11-21 RX ORDER — TRAMADOL HYDROCHLORIDE 50 MG/1
TABLET ORAL
Qty: 60 TABLET | Refills: 0 | Status: SHIPPED
Start: 2017-11-21 | End: 2018-01-02

## 2017-11-21 NOTE — TELEPHONE ENCOUNTER
NP please advise on refill muscle relaxer.     Refill Protocol Appointment Criteria  · Appointment scheduled in the past 6 months or in the next 3 months  Recent Outpatient Visits            4 months ago Dorsalgia of lumbar region    [facility], [address] [place] [address]

## 2018-01-02 NOTE — PROGRESS NOTES
HPI:    Patient ID: Chester Richards is a 32year old male. Patient is here for follow up for chronic medical issues- chronic pain syndrome for his back and anxiety. The patient is compliant with medications and no side effects.  There are no acute i ALPRAZolam 1 MG Oral Tab 60 tablet 2      Sig: TAKE 1 TABLET BY MOUTH TWICE A DAY AS NEEDED FOR ANXIETY           Imaging & Referrals:  None       WM#[id number]

## 2018-03-15 RX ORDER — TRAMADOL HYDROCHLORIDE 50 MG/1
TABLET ORAL
Qty: 120 TABLET | Refills: 0 | OUTPATIENT
Start: 2018-03-15 | End: 2018-05-01

## 2018-03-15 RX ORDER — ALPRAZOLAM 1 MG/1
TABLET ORAL
Qty: 60 TABLET | Refills: 0 | OUTPATIENT
Start: 2018-03-15 | End: 2018-05-01

## 2018-03-16 NOTE — TELEPHONE ENCOUNTER
Message noted. Chart reviewed. May refill medications as requested. Script reviewed and signed. Please call into pharmacy as this is controlled substance and notify patient. Thanks.

## 2018-03-16 NOTE — TELEPHONE ENCOUNTER
NO PROTOCOL,medications pended      Pending Prescriptions Disp Refills    ALPRAZOLAM 1 MG Oral Tab [Pharmacy Med Name: ALPRAZOLAM 1MG TABLETS] 60 tablet 0     Sig: TAKE 1 TABLET BY MOUTH TWICE DAILY AS NEEDED FOR ANXIETY      TRAMADOL HCL 50 MG Oral Tab [P

## 2018-04-30 RX ORDER — ALPRAZOLAM 1 MG/1
TABLET ORAL
Qty: 60 TABLET | Status: CANCELLED | OUTPATIENT
Start: 2018-04-30

## 2018-05-01 ENCOUNTER — TELEPHONE (OUTPATIENT)
Dept: OTHER | Age: 28
End: 2018-05-01

## 2018-05-01 RX ORDER — TRAMADOL HYDROCHLORIDE 50 MG/1
TABLET ORAL
Qty: 120 TABLET | Refills: 0 | Status: SHIPPED
Start: 2018-05-01 | End: 2018-05-30

## 2018-05-01 RX ORDER — ALPRAZOLAM 1 MG/1
TABLET ORAL
Qty: 60 TABLET | Refills: 0 | Status: SHIPPED
Start: 2018-05-01 | End: 2018-05-30

## 2018-05-01 NOTE — TELEPHONE ENCOUNTER
Message noted. Chart reviewed. May refill medications as requested. Script reviewed and signed/ printed. Will have nurse fax into pharmacy as this is controlled substance and notify patient.

## 2018-05-02 NOTE — TELEPHONE ENCOUNTER
Portia Auguste is calling on behalf of pt   Pt Mom [facility]   Pt Mom is now requesting to have sent to   Brenda Navarro- on file

## 2018-05-31 RX ORDER — TRAMADOL HYDROCHLORIDE 50 MG/1
TABLET ORAL
Qty: 120 TABLET | Refills: 2 | Status: SHIPPED
Start: 2018-05-31 | End: 2018-08-28

## 2018-05-31 RX ORDER — ALPRAZOLAM 1 MG/1
TABLET ORAL
Qty: 60 TABLET | Refills: 2 | Status: SHIPPED
Start: 2018-05-31 | End: 2018-08-28

## 2018-05-31 NOTE — TELEPHONE ENCOUNTER
Medication pending for review. If approved needs to be called in or faxed by on-site staff.  Please respond to pool: EM [facility] LPN/CMA    Last Rx: tramadol and alprazolam= 5/1/18    Recent Outpatient Visits            4 months ago [facility]

## 2018-07-07 NOTE — TELEPHONE ENCOUNTER
Pt. informed insurance was verified and Physical therapy is a covered benefit and does not require authorization for initial evaluation. Can proceed with scheduling appt. Reid Titus
5

## 2018-08-29 ENCOUNTER — TELEPHONE (OUTPATIENT)
Dept: FAMILY MEDICINE CLINIC | Facility: CLINIC | Age: 28
End: 2018-08-29

## 2018-08-29 RX ORDER — ALPRAZOLAM 1 MG/1
TABLET ORAL
Qty: 60 TABLET | Refills: 2 | Status: SHIPPED
Start: 2018-08-29 | End: 2018-11-25

## 2018-08-29 RX ORDER — TRAMADOL HYDROCHLORIDE 50 MG/1
TABLET ORAL
Qty: 120 TABLET | Refills: 2 | Status: SHIPPED
Start: 2018-08-29 | End: 2018-12-13

## 2018-08-29 NOTE — TELEPHONE ENCOUNTER
PA approved for #120/30 days effective 8/29/2018 for 15 days; [facility] pharmacy notified of the approval.

## 2018-08-29 NOTE — TELEPHONE ENCOUNTER
Mom called to follow up on request below. States she contacted pharmacy and was advised doctor's office should contact pt's insurance provider, [employer].

## 2018-08-29 NOTE — TELEPHONE ENCOUNTER
No Protocol on this med.        Pending Prescriptions Disp Refills    ALPRAZOLAM 1 MG Oral Tab [Pharmacy Med Name: ALPRAZOLAM 1 MG TABLET] 60 tablet 2     Sig: TAKE 1 TABLET BY MOUTH TWICE A DAY AS NEEDED FOR ANXIETY      TRAMADOL HCL 50 MG Oral Tab [Pharma

## 2018-08-29 NOTE — TELEPHONE ENCOUNTER
Patient's mother calling in, wanting clarification on patient's recent refill for Tramadol. Per patient's insurance, pharmacy can only dispense 2 weeks at a time. This is hard for patient's schedule to have to keep refilling.  Advise that patient has to tova

## 2018-10-01 ENCOUNTER — TELEPHONE (OUTPATIENT)
Dept: FAMILY MEDICINE CLINIC | Facility: CLINIC | Age: 28
End: 2018-10-01

## 2018-10-01 NOTE — TELEPHONE ENCOUNTER
Per pt's mother pharmacy was going to forward over some information for Dr to reach out to insurance and explain why pt is taking this medication and why it needs to be taking it long-term. She states the pharmacy sent over a faxed. pls advise.  Thank you      Current Outpatient Medications:  TRAMADOL HCL 50 MG Oral Tab TAKE 1 TABLET BY MOUTH EVERY 6 HOURS AS NEEDED FOR PAIN Disp: 120 tablet Rfl: 2

## 2018-10-01 NOTE — TELEPHONE ENCOUNTER
Dr. Nya Rajput I called the pharmacy and was inform that due to insurance you will have to do a PA for pt tramadol if not pt can only get a 7 day supply at a time no refill. Do you want us to proceed with the PA?

## 2018-11-20 ENCOUNTER — TELEPHONE (OUTPATIENT)
Dept: FAMILY MEDICINE CLINIC | Facility: CLINIC | Age: 28
End: 2018-11-20

## 2018-11-24 NOTE — TELEPHONE ENCOUNTER
Message noted; can inform patient of PA denial and can have patient make appointment to discuss further treatment.

## 2018-11-26 RX ORDER — ALPRAZOLAM 1 MG/1
TABLET ORAL
Qty: 60 TABLET | Refills: 2 | Status: SHIPPED
Start: 2018-11-26 | End: 2019-02-07

## 2018-11-26 NOTE — TELEPHONE ENCOUNTER
Requested Prescriptions     Pending Prescriptions Disp Refills   • ALPRAZOLAM 1 MG Oral Tab [Pharmacy Med Name: ALPRAZOLAM 1 MG TABLET] 60 tablet 2     Sig: TAKE 1 TABLET BY MOUTH TWICE A DAY AS NEEDED FOR ANXIETY       Last Office Visit with PCP: 1/2/2018

## 2018-11-27 NOTE — TELEPHONE ENCOUNTER
Spoke with patient informed of the denial and patient is going to change his insurance for 2019 in order to get his medications. Patient states he will make an appointment once the new insurance starts.  He also requested a refill for Alprazolam. Advised re

## 2018-12-14 NOTE — TELEPHONE ENCOUNTER
Last refilled 8-29-18 #120 #2RF  Rx needs to be phoned in if approved.    Recent Outpatient Visits            11 months ago Olga Fajardo MD    Office Visit    1 year ago Dorsalgia of lumbar region

## 2018-12-15 RX ORDER — TRAMADOL HYDROCHLORIDE 50 MG/1
TABLET ORAL
Qty: 120 TABLET | Refills: 0 | Status: SHIPPED
Start: 2018-12-15 | End: 2019-01-14

## 2018-12-15 NOTE — TELEPHONE ENCOUNTER
Message noted. Chart reviewed. May refill medication as requested. Script reviewed and signed/ printed. Given to nurse fax into pharmacy as this is controlled substance and notify patient to make appointment.

## 2019-01-14 RX ORDER — TRAMADOL HYDROCHLORIDE 50 MG/1
TABLET ORAL
Qty: 120 TABLET | Refills: 0 | Status: SHIPPED
Start: 2019-01-14 | End: 2019-02-07

## 2019-01-14 RX ORDER — TRAMADOL HYDROCHLORIDE 50 MG/1
TABLET ORAL
Qty: 120 TABLET | Refills: 0 | OUTPATIENT
Start: 2019-01-14

## 2019-01-14 NOTE — TELEPHONE ENCOUNTER
Mother Mayuri(on hipaa) called indicated that patient's insurance changed to [employer]. Patient wants to continue to see Dr Mauro Holter and will be contacting the insurance to switch to a carrier that Dr Mauro Holter accepts.  In the meantime patient needs a refill on his t

## 2019-02-07 ENCOUNTER — TELEPHONE (OUTPATIENT)
Dept: OTHER | Age: 29
End: 2019-02-07

## 2019-02-07 RX ORDER — TRAMADOL HYDROCHLORIDE 50 MG/1
TABLET ORAL
Qty: 120 TABLET | Refills: 0 | Status: SHIPPED
Start: 2019-02-07 | End: 2019-03-06

## 2019-02-07 RX ORDER — ALPRAZOLAM 1 MG/1
TABLET ORAL
Qty: 60 TABLET | Refills: 0 | Status: SHIPPED
Start: 2019-02-07 | End: 2019-03-06

## 2019-02-07 NOTE — TELEPHONE ENCOUNTER
Message noted; have not seen patient in some time- in over a year; he will have to find a physician that takes [facility] for further refills. Do not feel comfortable continuing refills without seeing patient.  If having acute issues from an injury would re

## 2019-02-07 NOTE — TELEPHONE ENCOUNTER
Received transfer from [address] [place] [address] stated Pt's mother tried making an Appt for pt but he has [employer] [facility] to her that plan was out of our network, stated Pt's mother wanted to speak to the nurse     Pt's mother stated Dr Rema Lara was still refilling his Prescr

## 2019-02-07 NOTE — TELEPHONE ENCOUNTER
Pt mom called informed of Dr. Mauro Holter message below and also was told that he will have to see  another Doctor until he get switch by to Dr. Mauro Holter. .  NO FURTHER REFILLS

## 2019-02-25 ENCOUNTER — TELEPHONE (OUTPATIENT)
Dept: FAMILY MEDICINE CLINIC | Facility: CLINIC | Age: 29
End: 2019-02-25

## 2019-03-06 RX ORDER — ALPRAZOLAM 1 MG/1
TABLET ORAL
Qty: 60 TABLET | Refills: 0 | Status: SHIPPED
Start: 2019-03-06 | End: 2019-04-08

## 2019-03-06 RX ORDER — TRAMADOL HYDROCHLORIDE 50 MG/1
TABLET ORAL
Qty: 120 TABLET | Refills: 0 | Status: SHIPPED
Start: 2019-03-06 | End: 2019-04-08

## 2019-03-06 NOTE — TELEPHONE ENCOUNTER
Spoke with patient ( verified) and relayed NP message below--patient verbalizes understanding and agreement. Patient states, They changed my insurance changed to something Dr. Oneal Raymundo doesn't accept and they won't let me pay cash for an appointment.  I am

## 2019-03-06 NOTE — TELEPHONE ENCOUNTER
Called patient left detailed message regarding denial and Dr. Manuel Em recommendation to make an appointment to follow up to discuss further treatment options or if he chooses to pay out of pocket for the medication.

## 2019-03-06 NOTE — TELEPHONE ENCOUNTER
Message noted. Chart reviewed. May refill medications as requested for now. To schedule appointment when he can. Scripts reviewed and signed/ printed. Given to nurse fax into pharmacy as this is controlled substance and notify patient.

## 2019-03-06 NOTE — TELEPHONE ENCOUNTER
Message noted; can inform patient of PA denial and can have patient make appointment to discuss further treatment or can pay out of pocket for his tramadol.

## 2019-03-06 NOTE — TELEPHONE ENCOUNTER
PA denied. Approval Criteria for Long Term Therapy (defined as a claims history of a 28-day supply or more of opioid within a 90 day period or request for an extended release opioid) (must meet all): 1.  Previously received short term opioid therapy via [facility]

## 2019-03-11 RX ORDER — TRAMADOL HYDROCHLORIDE 50 MG/1
TABLET ORAL
Qty: 120 TABLET | Refills: 0 | OUTPATIENT
Start: 2019-03-11

## 2019-03-11 NOTE — TELEPHONE ENCOUNTER
Clinic staff please f/u on script, thank you.  Second refill request received for tramadol, see refill encounter from 3/10/18 Please respond to pool: [facility] LPN/GUERLINE

## 2019-03-12 NOTE — TELEPHONE ENCOUNTER
[facility] states 3/6/19 Tramadol Rx not received (faxed or otherwise). Verbal given to [facility].

## 2019-04-08 ENCOUNTER — TELEPHONE (OUTPATIENT)
Dept: OTHER | Age: 29
End: 2019-04-08

## 2019-04-08 RX ORDER — TRAMADOL HYDROCHLORIDE 50 MG/1
TABLET ORAL
Qty: 120 TABLET | Refills: 0 | Status: SHIPPED | OUTPATIENT
Start: 2019-04-08 | End: 2019-05-04

## 2019-04-08 RX ORDER — ALPRAZOLAM 1 MG/1
TABLET ORAL
Qty: 60 TABLET | Refills: 0 | Status: SHIPPED | OUTPATIENT
Start: 2019-04-08 | End: 2019-05-04

## 2019-04-08 NOTE — TELEPHONE ENCOUNTER
Per patient's mom Tramadol needs a PA. Initiated with [employer] but plan stats cannot be done through them. Mom states patient has BCBS but member not found.

## 2019-04-08 NOTE — TELEPHONE ENCOUNTER
Mother called for pt. Explained pt is still waiting for insurance change, requesting refill for pt. Pt is out of medication.           traMADol HCl 50 MG Oral Tab TAKE 1 TABLET BY MOUTH EVERY SIX HOURS AS NEEDED FOR PAIN Disp: 120 tablet Rfl: 0   ALPRAZolam

## 2019-04-08 NOTE — TELEPHONE ENCOUNTER
.Review pended refill request as it does not fall under a protocol. Last Rx: 03/06/19 for both refill requests  LOV: 01/02/08   Please advise on refill requests. CSS, please call pt and schedule OV.

## 2019-04-08 NOTE — TELEPHONE ENCOUNTER
Pt mom call informed that he refill the medication time ONLY. Mom was informed that he either have to find another doctor that takes pt insurance for further refills. NO MORE REFILLS ON ANY MEDICATIONS. WE DO NOT ACCEPT PT INSURANCE.

## 2019-04-08 NOTE — TELEPHONE ENCOUNTER
Message noted. Chart reviewed. May refill medication as requested. Script reviewed and signed/ printed. Given to nurse to fax into pharmacy /  scripts as this is controlled substance and notify patient.  Pt needs to see another provider for further r

## 2019-04-09 ENCOUNTER — OFFICE VISIT (OUTPATIENT)
Dept: FAMILY MEDICINE CLINIC | Facility: CLINIC | Age: 29
End: 2019-04-09
Payer: MEDICAID

## 2019-04-09 VITALS
WEIGHT: 180 LBS | DIASTOLIC BLOOD PRESSURE: 72 MMHG | HEIGHT: 75 IN | RESPIRATION RATE: 20 BRPM | SYSTOLIC BLOOD PRESSURE: 118 MMHG | BODY MASS INDEX: 22.38 KG/M2 | HEART RATE: 81 BPM | TEMPERATURE: 98 F

## 2019-04-09 DIAGNOSIS — G89.29 CHRONIC LOW BACK PAIN WITH RIGHT-SIDED SCIATICA, UNSPECIFIED BACK PAIN LATERALITY: Primary | ICD-10-CM

## 2019-04-09 DIAGNOSIS — M54.41 CHRONIC LOW BACK PAIN WITH RIGHT-SIDED SCIATICA, UNSPECIFIED BACK PAIN LATERALITY: Primary | ICD-10-CM

## 2019-04-09 PROCEDURE — 99212 OFFICE O/P EST SF 10 MIN: CPT | Performed by: FAMILY MEDICINE

## 2019-04-09 PROCEDURE — 99213 OFFICE O/P EST LOW 20 MIN: CPT | Performed by: FAMILY MEDICINE

## 2019-04-09 NOTE — TELEPHONE ENCOUNTER
Prior authorization for Tramadol 50 mg oral tab completed w/ [employer] on cover my meds Key:[id number], turn around time 3-5 days.

## 2019-04-09 NOTE — PROGRESS NOTES
HPI:    Patient ID: Mago Richards is a 29year old male. Patient is here for follow up for chronic medical issues- chronic back pain. The patient is compliant with medications and no side effects.  There are no acute issues and patient is requesti

## 2019-04-09 NOTE — TELEPHONE ENCOUNTER
Called [employer] to initiate PA. Per rep pt is currently active for medical benefits and not pharmacy. Patient is currently inactive for pharm benefits and has been since 02/2018.  Patient would have to contact member services for further assistance in enrolling

## 2019-04-09 NOTE — TELEPHONE ENCOUNTER
Mom calling back states she got off the phone with [facility] and everything with the prescriptions is straightened out. Asking for Edin Reveles to please call her back.

## 2019-04-10 NOTE — TELEPHONE ENCOUNTER
PA approved effective 4/1/2019-5/10/2019; spoke with [facility] pharmacist and notified of the approval.

## 2019-04-10 NOTE — TELEPHONE ENCOUNTER
Called patient's mom Mayuri and informed of the approval. Mom paid cash for the Tramadol on 4/9/2019 so next fill would be on 5/9/2019.

## 2019-05-04 NOTE — TELEPHONE ENCOUNTER
Controlled medication pending for review. If approved needs to be called in or faxed by on-site staff.     Last Rx: 4-8-19  LOV: 4-9-19

## 2019-05-04 NOTE — TELEPHONE ENCOUNTER
Pt's mother called in stating that Pt is out of medications below (she was told today). She is requesting the medication get refilled ASAP. Mother states that she will  script. Please advise.      Current Outpatient Medications:   •  traMADol

## 2019-05-06 ENCOUNTER — TELEPHONE (OUTPATIENT)
Dept: FAMILY MEDICINE CLINIC | Facility: CLINIC | Age: 29
End: 2019-05-06

## 2019-05-06 RX ORDER — TRAMADOL HYDROCHLORIDE 50 MG/1
TABLET ORAL
Qty: 120 TABLET | Refills: 0 | Status: SHIPPED
Start: 2019-05-06 | End: 2019-05-08

## 2019-05-06 RX ORDER — ALPRAZOLAM 1 MG/1
TABLET ORAL
Qty: 60 TABLET | Refills: 0 | Status: SHIPPED
Start: 2019-05-06 | End: 2019-06-13

## 2019-05-06 NOTE — TELEPHONE ENCOUNTER
Message noted. Chart reviewed. May refill medications as requested. Script reviewed and signed/ printed. Given to nurse to fax into pharmacy as this is a controlled substance. Staff to contact patient.

## 2019-05-07 ENCOUNTER — TELEPHONE (OUTPATIENT)
Dept: FAMILY MEDICINE CLINIC | Facility: CLINIC | Age: 29
End: 2019-05-07

## 2019-05-07 RX ORDER — TRAMADOL HYDROCHLORIDE 50 MG/1
TABLET ORAL
Qty: 120 TABLET | Refills: 0 | OUTPATIENT
Start: 2019-05-07

## 2019-05-07 NOTE — TELEPHONE ENCOUNTER
Spoke with [address] who states patient picked up #60 alprazolam on 4/18/2019 and now the plan is requiring another PA for the Tramadol due to the benzodiazapine recent usage.  PA resubmitted for Tramadol HCL 50 mg tab with [employer] [facility]

## 2019-05-07 NOTE — TELEPHONE ENCOUNTER
Message noted and scripts were sent to pharmacy already. This is a prior authorization issue as below and his insurance will not cover if patient is on 2 controlled substances.

## 2019-05-07 NOTE — TELEPHONE ENCOUNTER
Pt mom Mayuri called in she stated if there is a problem with getting the PA for the TraMaDol. She will  script and pay for it herself because Pt is in pain    Please advise.   Pt mom said can call anytime      traMADol HCl 50 MG Oral Tab TAKE 1 TABL

## 2019-05-07 NOTE — TELEPHONE ENCOUNTER
Pt's mother Marilu So is calling again to check status of medication refill. She states pt is out of Tramadol  And Aprazolam and in a lot of pain. . Dr Axel Skiff please advise.

## 2019-05-07 NOTE — TELEPHONE ENCOUNTER
[facility] and spoke with the pharmacist, states that they received the order for the alprazolam and tramadol but showing Warning due to interaction ( 2 controlled medication and alprazolam) .   Spoke with mother, advised that she does not ne

## 2019-05-07 NOTE — TELEPHONE ENCOUNTER
Mother following up on refill for Tramadol, was aware there is a current order in the system.  Contacted patient's [facility] and confirmed with pharmacist the reason the Tramadol was not going through was because patient is on 2 controlled meds, is

## 2019-05-07 NOTE — TELEPHONE ENCOUNTER
Spoke with [address] who states patient picked up #60 alprazolam on 4/18/2019 and now the plan is requiring another PA for the Tramadol due to the benzodiazapine recent usage.  PA resubmitted for Tramadol HCL 50 mg tab with [employer] [facility] [place]

## 2019-05-08 NOTE — TELEPHONE ENCOUNTER
PA denied. Patient must have an approved condition for the requested drug which includes long term pain due to active cancer. Patient must be eligible for hospice care.  The prescriber must be a pain specialist and or must talk with a pain specialist about

## 2019-05-09 RX ORDER — TRAMADOL HYDROCHLORIDE 50 MG/1
TABLET ORAL
Qty: 120 TABLET | Refills: 0 | Status: SHIPPED
Start: 2019-05-09 | End: 2019-06-06

## 2019-05-09 NOTE — TELEPHONE ENCOUNTER
Message noted. Chart reviewed. May refill medication as requested. Script reviewed and signed/ printed. Given to nurse to fax into pharmacy as this is a controlled substance. Staff to contact patient.

## 2019-05-24 ENCOUNTER — OFFICE VISIT (OUTPATIENT)
Dept: NEUROLOGY | Facility: CLINIC | Age: 29
End: 2019-05-24
Payer: MEDICAID

## 2019-05-24 VITALS
BODY MASS INDEX: 22.53 KG/M2 | RESPIRATION RATE: 16 BRPM | DIASTOLIC BLOOD PRESSURE: 72 MMHG | WEIGHT: 185 LBS | HEART RATE: 100 BPM | HEIGHT: 76 IN | SYSTOLIC BLOOD PRESSURE: 110 MMHG

## 2019-05-24 DIAGNOSIS — M54.16 LUMBAR RADICULOPATHY: Primary | ICD-10-CM

## 2019-05-24 DIAGNOSIS — G47.00 INSOMNIA, UNSPECIFIED TYPE: ICD-10-CM

## 2019-05-24 DIAGNOSIS — F41.9 ANXIETY: ICD-10-CM

## 2019-05-24 PROCEDURE — 99215 OFFICE O/P EST HI 40 MIN: CPT | Performed by: PHYSICAL MEDICINE & REHABILITATION

## 2019-05-24 RX ORDER — PREDNISONE 10 MG/1
TABLET ORAL
Qty: 28 TABLET | Refills: 0 | Status: SHIPPED | OUTPATIENT
Start: 2019-05-24 | End: 2020-07-16

## 2019-05-24 RX ORDER — CYCLOBENZAPRINE HCL 10 MG
10 TABLET ORAL NIGHTLY
Qty: 30 TABLET | Refills: 0 | Status: SHIPPED | OUTPATIENT
Start: 2019-05-24 | End: 2019-06-23

## 2019-05-24 NOTE — PROGRESS NOTES
[address]  Progress Note    CHIEF COMPLAINT:  Patient presents with:  Low Back Pain: Patient presents for low back pain, referred by .  Patient c/o pain for past 5 years but worsened over the past year User    Substance and Sexual Activity      Alcohol use: No        Alcohol/week: 0.0 oz      Drug use: Yes        Types: Cannabis      Sexual activity: Not on file      FAMILY HISTORY:   Family History   Problem Relation Age of Onset   • Asthma Mother admits  Depressed Mood: denies         All other systems reviewed and are negative. Pertinent positives and negatives noted in the HPI.       PHYSICAL EXAM:   /72 (BP Location: Right arm, Patient Position: Sitting, Cuff Size: adult)   Pulse 100   Resp was in agreement with the assessment and plan. All questions were answered. There were no barriers to learning.         Sophy Goodman MD  Physical Medicine and Rehabilitation/Sports Medicine  [facility]

## 2019-05-26 PROBLEM — G47.00 INSOMNIA: Status: ACTIVE | Noted: 2019-05-26

## 2019-05-26 PROBLEM — F41.9 ANXIETY: Status: ACTIVE | Noted: 2019-05-26

## 2019-06-06 RX ORDER — TRAMADOL HYDROCHLORIDE 50 MG/1
TABLET ORAL
Qty: 120 TABLET | Refills: 0 | Status: SHIPPED
Start: 2019-06-06 | End: 2019-06-13

## 2019-06-06 NOTE — TELEPHONE ENCOUNTER
Controlled medication pending for review. If approved needs to be called in or faxed by on-site staff.     Last Rx: 5/9/19  LOV: Recent Visits  Date Type Provider Dept   04/09/19 Office Visit Audra Lobo MD [facility]   01/02/18 Office Visit [facility]

## 2019-06-14 RX ORDER — ALPRAZOLAM 1 MG/1
TABLET ORAL
Qty: 60 TABLET | Refills: 0 | Status: SHIPPED | OUTPATIENT
Start: 2019-06-14 | End: 2019-07-16

## 2019-06-14 NOTE — TELEPHONE ENCOUNTER
Faxed Alprazolam prescription to [facility] in [address] [place], [place] [address]. Fax was successful.

## 2019-06-14 NOTE — TELEPHONE ENCOUNTER
Controlled medication pending for review. If approved needs to be called in or faxed by on-site staff. Dr. Gustavo Chun out of office, therefore rx sent to [facility] as signature required.     Last Rx: 5/6/19  LOV: 5/24/19    Requested Prescriptions   Pending Prescripti

## 2019-06-14 NOTE — TELEPHONE ENCOUNTER
Controlled medication pending for review. If approved needs to be called in or faxed by on-site staff. rx approved on 6/6/19. However per note below, refills being requested.   Routed to Dr. Oneal Raymundo as RN unsure if covering provider will approve additional

## 2019-06-14 NOTE — TELEPHONE ENCOUNTER
Refill noted. Chart reviewed. Okay to refill times one. Printed, signed and handed to front staff to inform pt that script is ready. Refilled on behalf of Dr. Myesha Dove.

## 2019-06-15 RX ORDER — TRAMADOL HYDROCHLORIDE 50 MG/1
TABLET ORAL
Qty: 120 TABLET | Refills: 0 | OUTPATIENT
Start: 2019-06-15 | End: 2019-07-01

## 2019-06-19 NOTE — TELEPHONE ENCOUNTER
Dr Gustavo Chun,   Spoke to [facility] pharmacist, Tramadol medication was just filled 6/6/19. Do you want to approve another refill? Please advise.

## 2019-06-24 NOTE — TELEPHONE ENCOUNTER
Lmtcb need to inform patient message below. Sent patient a no response letter. Dr Keegan Maurer only an [facility]. Patient does not answer or returns my call.

## 2019-07-01 RX ORDER — TRAMADOL HYDROCHLORIDE 50 MG/1
TABLET ORAL
Qty: 120 TABLET | Refills: 0 | Status: SHIPPED
Start: 2019-07-01 | End: 2019-07-27

## 2019-07-01 NOTE — TELEPHONE ENCOUNTER
Review pended refill request as it does not fall under a protocol.     Last Rx: 6/15/19  LOV: 4/9/19

## 2019-07-17 RX ORDER — ALPRAZOLAM 1 MG/1
TABLET ORAL
Qty: 60 TABLET | Refills: 1 | Status: SHIPPED
Start: 2019-07-17 | End: 2019-09-25

## 2019-07-17 NOTE — TELEPHONE ENCOUNTER
Controlled medications pending for review. If approved needs to be called in or faxed by on site staff.     Last Rx: 6-14-19 # 60  LOV: 4-9-19    Requested Prescriptions     Pending Prescriptions Disp Refills   • ALPRAZOLAM 1 MG Oral Tab [Pharmacy Med Name:

## 2019-07-27 RX ORDER — TRAMADOL HYDROCHLORIDE 50 MG/1
TABLET ORAL
Qty: 120 TABLET | Refills: 0 | Status: SHIPPED
Start: 2019-07-27 | End: 2019-08-25

## 2019-07-27 NOTE — TELEPHONE ENCOUNTER
Controlled medications pending for review. If approved needs to be called in or faxed by on site staff.     Last Rx: 7-1-19 # 120  LOV: 4-9-19    Requested Prescriptions     Pending Prescriptions Disp Refills   • TRAMADOL HCL 50 MG Oral Tab [Pharmacy Med Na

## 2019-07-30 RX ORDER — TRAMADOL HYDROCHLORIDE 50 MG/1
TABLET ORAL
Qty: 120 TABLET | Refills: 0 | OUTPATIENT
Start: 2019-07-30

## 2019-08-27 RX ORDER — TRAMADOL HYDROCHLORIDE 50 MG/1
TABLET ORAL
Qty: 120 TABLET | Refills: 0 | Status: SHIPPED
Start: 2019-08-27 | End: 2019-09-25

## 2019-08-27 NOTE — TELEPHONE ENCOUNTER
Pt mom called and stated that the medication is still not there.   Also she will like to see if it can go back three refill at at time instead of one       Please advise

## 2019-08-27 NOTE — TELEPHONE ENCOUNTER
Controlled medications pending for review. If approved needs to be called in or faxed by on site staff.     Last Rx: 7-27-19 # 120  LOV: 4-9-19    Requested Prescriptions     Pending Prescriptions Disp Refills   • TRAMADOL HCL 50 MG Oral Tab [Pharmacy Med N

## 2019-09-26 RX ORDER — TRAMADOL HYDROCHLORIDE 50 MG/1
TABLET ORAL
Qty: 120 TABLET | Refills: 0 | Status: SHIPPED | OUTPATIENT
Start: 2019-09-26 | End: 2019-10-28

## 2019-09-26 RX ORDER — ALPRAZOLAM 1 MG/1
TABLET ORAL
Qty: 60 TABLET | Refills: 0 | Status: SHIPPED | OUTPATIENT
Start: 2019-09-26 | End: 2019-10-28

## 2019-09-26 NOTE — TELEPHONE ENCOUNTER
Controlled medication pending for review. If approved needs to be called in or faxed by on-site staff.     Requested Prescriptions     Pending Prescriptions Disp Refills   • TRAMADOL HCL 50 MG Oral Tab [Pharmacy Med Name: Tramadol Hcl 50 Mg Tab Shannon] 120 t

## 2019-09-26 NOTE — TELEPHONE ENCOUNTER
Message noted: Chart reviewed and may refill tramadol as requested times one. Script sent to listed pharmacy by secure method. [place] notify [place] No concerns noted.

## 2019-09-26 NOTE — TELEPHONE ENCOUNTER
Message noted: Chart reviewed and may refill alprazolam as requested times one. Script sent to listed pharmacy by secure method. Please notify [place] No concerns noted.

## 2019-10-28 RX ORDER — ALPRAZOLAM 1 MG/1
TABLET ORAL
Qty: 60 TABLET | Refills: 0 | Status: SHIPPED | OUTPATIENT
Start: 2019-10-28 | End: 2019-12-02

## 2019-10-28 RX ORDER — TRAMADOL HYDROCHLORIDE 50 MG/1
TABLET ORAL
Qty: 120 TABLET | Refills: 0 | Status: SHIPPED | OUTPATIENT
Start: 2019-10-28 | End: 2019-12-02

## 2019-10-28 NOTE — TELEPHONE ENCOUNTER
Patient requesting if can have 3 refills for    ALPRAZOLAM 1 MG Oral Tab, TAKE 1 TABLET BY MOUTH TWICE DAILY AS NEEDED FOR ANXIETY, Disp: 60 tablet, Rfl: 0      So does not have to call every month .

## 2019-10-28 NOTE — TELEPHONE ENCOUNTER
Mother wants to know if can get 3 refills for TRAMADOL HCL 50 MG Oral Tab    This is so does not have to call every month

## 2019-10-28 NOTE — TELEPHONE ENCOUNTER
Message noted: Chart reviewed and may refill medications as requested times one. Scripts sent to listed pharmacy by secure method. Please notify [place] No concerns noted.

## 2019-12-03 RX ORDER — ALPRAZOLAM 1 MG/1
TABLET ORAL
Qty: 60 TABLET | Refills: 0 | Status: SHIPPED | OUTPATIENT
Start: 2019-12-03 | End: 2020-01-02

## 2019-12-03 RX ORDER — TRAMADOL HYDROCHLORIDE 50 MG/1
TABLET ORAL
Qty: 120 TABLET | Refills: 0 | Status: SHIPPED | OUTPATIENT
Start: 2019-12-03 | End: 2020-01-03

## 2019-12-03 NOTE — TELEPHONE ENCOUNTER
Controlled medication pending for review. Please change to phone in, fax, or print script if not being sent electronically.     Last Rx: 10/28/19  LOV: 04/29/19

## 2019-12-03 NOTE — TELEPHONE ENCOUNTER
Rx is in transmission it will take 3 hours before before pt Pharmacy recd Rx. Please inform pt if they call us.   thanks

## 2019-12-03 NOTE — TELEPHONE ENCOUNTER
Review pended refill request as it does not fall under a protocol.     Last Rx: 10/28/19  LOV: 04/29/19

## 2019-12-09 ENCOUNTER — MED REC SCAN ONLY (OUTPATIENT)
Dept: FAMILY MEDICINE CLINIC | Facility: CLINIC | Age: 29
End: 2019-12-09

## 2020-01-02 RX ORDER — ALPRAZOLAM 1 MG/1
TABLET ORAL
Qty: 60 TABLET | Refills: 0 | Status: SHIPPED | OUTPATIENT
Start: 2020-01-02 | End: 2020-02-01

## 2020-01-03 RX ORDER — TRAMADOL HYDROCHLORIDE 50 MG/1
TABLET ORAL
Qty: 120 TABLET | Refills: 0 | Status: SHIPPED | OUTPATIENT
Start: 2020-01-03 | End: 2020-02-01

## 2020-01-03 NOTE — TELEPHONE ENCOUNTER
Controlled medication pending for review. Please change to phone in, fax, or print script if not being sent electronically.     Last Rx: 12-3-19 # 120  LOV: 4-9-19    Requested Prescriptions     Pending Prescriptions Disp Refills   • traMADol HCl 50 MG Ora

## 2020-01-03 NOTE — TELEPHONE ENCOUNTER
•  TRAMADOL HCL 50 MG Oral Tab, TAKE ONE TABLET BY MOUTH EVERY SIX HOURS AS NEEDED FOR PAIN, Disp: 120 tablet, Rfl: 0

## 2020-02-03 RX ORDER — TRAMADOL HYDROCHLORIDE 50 MG/1
TABLET ORAL
Qty: 120 TABLET | Refills: 0 | Status: SHIPPED | OUTPATIENT
Start: 2020-02-03 | End: 2020-02-22

## 2020-02-03 RX ORDER — ALPRAZOLAM 1 MG/1
TABLET ORAL
Qty: 60 TABLET | Refills: 0 | Status: SHIPPED | OUTPATIENT
Start: 2020-02-03 | End: 2020-02-27

## 2020-02-03 NOTE — TELEPHONE ENCOUNTER
Please call pt and schedule office visit.     Please Dr. Aidee Rey, please advise on refill request.

## 2020-02-03 NOTE — TELEPHONE ENCOUNTER
Message noted: Chart reviewed and may refill alprazolam as requested times one. Script sent to listed pharmacy by secure method.  Please notify patient that he needs an office visit

## 2020-02-03 NOTE — TELEPHONE ENCOUNTER
Message noted: Chart reviewed and may refill tramadol as requested times one. Script sent to listed pharmacy by secure method. Please notify patient that he needs an office visit.

## 2020-02-22 NOTE — TELEPHONE ENCOUNTER
To reception staff, pls call pt for appt. Controlled medication pending for review. Please change to phone in, fax, or print script if not being sent electronically.     Last Rx: 2-3-20 # 120  LOV: 4-9-19    Requested Prescriptions     Pending Prescr

## 2020-02-24 RX ORDER — TRAMADOL HYDROCHLORIDE 50 MG/1
TABLET ORAL
Qty: 120 TABLET | Refills: 1 | Status: SHIPPED | OUTPATIENT
Start: 2020-02-24 | End: 2020-03-27

## 2020-02-27 RX ORDER — ALPRAZOLAM 1 MG/1
TABLET ORAL
Qty: 60 TABLET | Refills: 0 | Status: SHIPPED | OUTPATIENT
Start: 2020-02-27 | End: 2020-03-27

## 2020-02-27 NOTE — TELEPHONE ENCOUNTER
Patients mom requesting refill to be sent to [facility]. States patient will be going out of town on 3/2 and can make an appointment when he returns but will need medication before that.        ALPRAZOLAM 1 MG Oral Tab

## 2020-02-27 NOTE — TELEPHONE ENCOUNTER
Controlled medication pending for review. Please change to phone in, fax, or print script if not being sent electronically.     Last Rx: 2/3/20  LOV: 4/9/19

## 2020-02-27 NOTE — TELEPHONE ENCOUNTER
Message noted: Chart reviewed and may refill alprazolam as requested times one. Script sent to listed pharmacy by secure method. Please notify patient he is due for appt.

## 2020-03-27 ENCOUNTER — TELEPHONE (OUTPATIENT)
Dept: FAMILY MEDICINE CLINIC | Facility: CLINIC | Age: 30
End: 2020-03-27

## 2020-03-27 RX ORDER — TRAMADOL HYDROCHLORIDE 50 MG/1
TABLET ORAL
Qty: 120 TABLET | Refills: 0 | Status: SHIPPED | OUTPATIENT
Start: 2020-03-27 | End: 2020-04-27

## 2020-03-27 RX ORDER — ALPRAZOLAM 1 MG/1
TABLET ORAL
Qty: 60 TABLET | Refills: 1 | Status: SHIPPED | OUTPATIENT
Start: 2020-03-27 | End: 2020-05-27

## 2020-03-27 NOTE — TELEPHONE ENCOUNTER
Patient's mother called to request a refill for     ALPRAZolam 1 MG Oral Tab 60 tablet   Sent to [facility] [place] [facility]      TRAMADOL HCL 50 MG Oral Tab 120 tablet   [facility] to [facility]       Patient is aware that he is due for an appointment but is concerned wit

## 2020-03-28 NOTE — TELEPHONE ENCOUNTER
Message noted: Chart reviewed and may refill alprazolam as requested times one. Script sent to listed pharmacy by secure method[place] No concerns noted. Pharmacy to notify patient.

## 2020-03-28 NOTE — TELEPHONE ENCOUNTER
Message noted: Chart reviewed and may refill tramadol as requested times one. Script sent to listed pharmacy by secure method. Pharmacy to [place] No concerns noted.

## 2020-04-13 ENCOUNTER — TELEPHONE (OUTPATIENT)
Dept: DERMATOLOGY CLINIC | Facility: CLINIC | Age: 30
End: 2020-04-13

## 2020-04-14 NOTE — TELEPHONE ENCOUNTER
Called pt back wanted to ask him two questions regarding his form please  transfer to [facility]#[id number]

## 2020-04-27 RX ORDER — TRAMADOL HYDROCHLORIDE 50 MG/1
TABLET ORAL
Qty: 120 TABLET | Refills: 0 | Status: SHIPPED | OUTPATIENT
Start: 2020-04-27 | End: 2020-04-29

## 2020-04-27 NOTE — TELEPHONE ENCOUNTER
Message noted: Chart reviewed and may refill medication as requested. Script sent to listed pharmacy by secure method. Pharmacy to notify patient.

## 2020-04-29 ENCOUNTER — TELEPHONE (OUTPATIENT)
Dept: FAMILY MEDICINE CLINIC | Facility: CLINIC | Age: 30
End: 2020-04-29

## 2020-04-29 RX ORDER — TRAMADOL HYDROCHLORIDE 50 MG/1
50 TABLET ORAL EVERY 6 HOURS PRN
Qty: 120 TABLET | Refills: 0 | Status: SHIPPED | OUTPATIENT
Start: 2020-04-29 | End: 2020-05-27

## 2020-04-29 NOTE — TELEPHONE ENCOUNTER
Patient's mom is requesting medication be sent to a different pharmacy since he is in [place]. Patient is out of medication. Pharmacy updated on chart.      TRAMADOL HCL 50 MG Oral Tab 120 tablet     Patient's phone number - [phone]

## 2020-04-29 NOTE — TELEPHONE ENCOUNTER
Spoke to mother Noa Oropeza ) who would like Dr. Elaina Seay to call [facility] for refill. Explained to mom that Ryan Shin can not call [place] for refills and Rosario Pacheco should find a doctor there for medication refills.  Mom stated that she will fill medication he

## 2020-04-29 NOTE — TELEPHONE ENCOUNTER
Message noted: Chart reviewed and may refill tramadol as requested times one. Script sent to listed pharmacy by secure method. Please notify patient/mother.  If patient is going to be in [address] [place] [address] Road permanently then will need to find a provider there for futur

## 2020-05-27 RX ORDER — ALPRAZOLAM 1 MG/1
TABLET ORAL
Qty: 60 TABLET | Refills: 0 | Status: SHIPPED | OUTPATIENT
Start: 2020-05-27 | End: 2020-06-25

## 2020-05-27 RX ORDER — ALPRAZOLAM 1 MG/1
TABLET ORAL
Qty: 60 TABLET | Refills: 0 | OUTPATIENT
Start: 2020-05-27

## 2020-05-27 RX ORDER — TRAMADOL HYDROCHLORIDE 50 MG/1
TABLET ORAL
Qty: 120 TABLET | Refills: 0 | Status: SHIPPED | OUTPATIENT
Start: 2020-05-27 | End: 2020-07-16

## 2020-05-27 NOTE — TELEPHONE ENCOUNTER
Message noted: Chart reviewed and may refill medication as requested. Script sent to listed pharmacy by secure method.

## 2020-05-27 NOTE — TELEPHONE ENCOUNTER
Message noted: Chart reviewed and may refill alprazolam as requested. Script sent to listed pharmacy by secure method[place] No concerns noted.  Please notify patient

## 2020-06-25 RX ORDER — ALPRAZOLAM 1 MG/1
TABLET ORAL
Qty: 60 TABLET | Refills: 0 | Status: SHIPPED | OUTPATIENT
Start: 2020-06-25 | End: 2020-07-16

## 2020-06-25 NOTE — TELEPHONE ENCOUNTER
Message noted: Chart reviewed and may refill alprazolam as requested. Script sent to listed pharmacy by secure method. [place] No concerns noted. Please notify patient. Will need appointment soon.

## 2020-07-16 ENCOUNTER — OFFICE VISIT (OUTPATIENT)
Dept: FAMILY MEDICINE CLINIC | Facility: CLINIC | Age: 30
End: 2020-07-16
Payer: MEDICAID

## 2020-07-16 VITALS
HEIGHT: 76 IN | WEIGHT: 178 LBS | DIASTOLIC BLOOD PRESSURE: 69 MMHG | HEART RATE: 66 BPM | SYSTOLIC BLOOD PRESSURE: 116 MMHG | BODY MASS INDEX: 21.68 KG/M2 | TEMPERATURE: 98 F | RESPIRATION RATE: 18 BRPM

## 2020-07-16 DIAGNOSIS — M54.9 CHRONIC BACK PAIN, UNSPECIFIED BACK LOCATION, UNSPECIFIED BACK PAIN LATERALITY: ICD-10-CM

## 2020-07-16 DIAGNOSIS — F41.9 ANXIETY: ICD-10-CM

## 2020-07-16 DIAGNOSIS — G89.29 CHRONIC BACK PAIN, UNSPECIFIED BACK LOCATION, UNSPECIFIED BACK PAIN LATERALITY: ICD-10-CM

## 2020-07-16 PROCEDURE — 3078F DIAST BP <80 MM HG: CPT | Performed by: FAMILY MEDICINE

## 2020-07-16 PROCEDURE — 3074F SYST BP LT 130 MM HG: CPT | Performed by: FAMILY MEDICINE

## 2020-07-16 PROCEDURE — 3008F BODY MASS INDEX DOCD: CPT | Performed by: FAMILY MEDICINE

## 2020-07-16 PROCEDURE — 99213 OFFICE O/P EST LOW 20 MIN: CPT | Performed by: FAMILY MEDICINE

## 2020-07-16 RX ORDER — ALPRAZOLAM 1 MG/1
1 TABLET ORAL 2 TIMES DAILY PRN
Qty: 60 TABLET | Refills: 0 | Status: SHIPPED | OUTPATIENT
Start: 2020-07-16 | End: 2020-08-05

## 2020-07-16 RX ORDER — TRAMADOL HYDROCHLORIDE 50 MG/1
50 TABLET ORAL EVERY 6 HOURS PRN
Qty: 120 TABLET | Refills: 0 | Status: SHIPPED | OUTPATIENT
Start: 2020-07-16 | End: 2020-08-05

## 2020-07-16 NOTE — PROGRESS NOTES
HPI:    Patient ID: Bryan Richards is a 34year old male. Patient is here for follow up for chronic medical issues- chronic back pains and anxiety. The patient is compliant with medications and no side effects.  There are no acute issues and patien 50 MG Oral Tab 120 tablet 0     Sig: Take 1 tablet (50 mg total) by mouth every 6 (six) hours as needed for Pain. • ALPRAZolam 1 MG Oral Tab 60 tablet 0     Sig: Take 1 tablet (1 mg total) by mouth 2 (two) times daily as needed for Anxiety.        Imaging

## 2020-08-05 RX ORDER — TRAMADOL HYDROCHLORIDE 50 MG/1
TABLET ORAL
Qty: 120 TABLET | Refills: 0 | Status: SHIPPED | OUTPATIENT
Start: 2020-08-05 | End: 2020-08-09

## 2020-08-05 RX ORDER — ALPRAZOLAM 1 MG/1
TABLET ORAL
Qty: 60 TABLET | Refills: 0 | Status: SHIPPED | OUTPATIENT
Start: 2020-08-05 | End: 2020-08-09

## 2020-08-05 NOTE — TELEPHONE ENCOUNTER
Message noted: Chart reviewed and may refill medications- tramadol/ alprazolam as requested. Script sent to listed pharmacy by secure method. Please notify pt.

## 2020-08-09 ENCOUNTER — TELEPHONE (OUTPATIENT)
Dept: FAMILY MEDICINE CLINIC | Facility: CLINIC | Age: 30
End: 2020-08-09

## 2020-08-09 RX ORDER — ALPRAZOLAM 1 MG/1
1 TABLET ORAL 2 TIMES DAILY PRN
Qty: 60 TABLET | Refills: 0 | Status: SHIPPED | OUTPATIENT
Start: 2020-08-09 | End: 2020-09-15

## 2020-08-09 RX ORDER — TRAMADOL HYDROCHLORIDE 50 MG/1
50 TABLET ORAL EVERY 6 HOURS PRN
Qty: 120 TABLET | Refills: 0 | Status: SHIPPED | OUTPATIENT
Start: 2020-08-09 | End: 2020-09-15

## 2020-09-15 RX ORDER — TRAMADOL HYDROCHLORIDE 50 MG/1
TABLET ORAL
Qty: 120 TABLET | Refills: 0 | Status: SHIPPED | OUTPATIENT
Start: 2020-09-15 | End: 2020-09-18

## 2020-09-15 RX ORDER — ALPRAZOLAM 1 MG/1
TABLET ORAL
Qty: 60 TABLET | Refills: 0 | Status: SHIPPED | OUTPATIENT
Start: 2020-09-15 | End: 2020-09-18

## 2020-09-18 RX ORDER — TRAMADOL HYDROCHLORIDE 50 MG/1
50 TABLET ORAL EVERY 6 HOURS PRN
Qty: 120 TABLET | Refills: 0 | Status: SHIPPED | OUTPATIENT
Start: 2020-09-18 | End: 2020-10-20

## 2020-09-18 RX ORDER — ALPRAZOLAM 1 MG/1
1 TABLET ORAL 2 TIMES DAILY PRN
Qty: 60 TABLET | Refills: 0 | Status: SHIPPED | OUTPATIENT
Start: 2020-09-18 | End: 2020-10-20

## 2020-09-18 NOTE — TELEPHONE ENCOUNTER
Valentina calling from the pharmacy calls and states all this patient medication should go to the Pharmacy in [address] for future refills   Patient stays there now

## 2020-09-19 NOTE — TELEPHONE ENCOUNTER
Message noted: Chart reviewed and may refill medications as requested times one. Script sent to listed pharmacy by secure method.     Please inform patient that he will need to get future scripts from a new physician in [address] if he is living there now

## 2020-10-20 RX ORDER — ALPRAZOLAM 1 MG/1
TABLET ORAL
Qty: 60 TABLET | Refills: 0 | Status: SHIPPED | OUTPATIENT
Start: 2020-10-20 | End: 2020-11-21

## 2020-10-20 RX ORDER — TRAMADOL HYDROCHLORIDE 50 MG/1
TABLET ORAL
Qty: 120 TABLET | Refills: 0 | Status: SHIPPED | OUTPATIENT
Start: 2020-10-20 | End: 2020-11-21

## 2020-10-20 NOTE — TELEPHONE ENCOUNTER
Message noted: Chart reviewed and may refill medications as requested. Script sent to listed pharmacy by secure method.     Please notify pt

## 2020-10-22 ENCOUNTER — TELEPHONE (OUTPATIENT)
Dept: FAMILY MEDICINE CLINIC | Facility: CLINIC | Age: 30
End: 2020-10-22

## 2020-10-22 NOTE — TELEPHONE ENCOUNTER
Spoke with pt,  verified  Pt requesed med for Tramadol and alprazolam to be transfer to [facility] [phone].

## 2020-10-22 NOTE — TELEPHONE ENCOUNTER
Spoke with pt, ALEXY verified he was informed med ref that was given on 20, [address] [place] [address] has it on file and the ref from 10-20-20 that was sent to [facility] they still have it.   Pt was informed [facility] will call him soon to p/u meds from [facility]

## 2020-10-22 NOTE — TELEPHONE ENCOUNTER
Called [facility] and spoke with Gerardo and he stated they got rx ref for Tramadol and alprazolam on file from 9-18-20. He will call pt for p/u.

## 2020-10-22 NOTE — TELEPHONE ENCOUNTER
Pharmacist David from [place], stated Pt wants them to be the primary pharmacy to keep from [place] across state line, when Rx is by Dr -[employer] will have to be called in or send  E-scribe    Unable to reach Pt to approve,left message to call back

## 2020-11-21 RX ORDER — ALPRAZOLAM 1 MG/1
TABLET ORAL
Qty: 60 TABLET | Refills: 0 | Status: SHIPPED | OUTPATIENT
Start: 2020-11-21 | End: 2020-12-21

## 2020-11-21 RX ORDER — TRAMADOL HYDROCHLORIDE 50 MG/1
TABLET ORAL
Qty: 120 TABLET | Refills: 0 | Status: SHIPPED | OUTPATIENT
Start: 2020-11-21 | End: 2020-12-21

## 2020-11-21 NOTE — TELEPHONE ENCOUNTER
Message noted: Chart reviewed and may refill medications as requested. Script sent to listed pharmacy by secure method.     Pharmacy to notify pt

## 2020-12-21 RX ORDER — ALPRAZOLAM 1 MG/1
TABLET ORAL
Qty: 60 TABLET | Refills: 0 | Status: SHIPPED | OUTPATIENT
Start: 2020-12-21 | End: 2021-02-06

## 2020-12-21 RX ORDER — TRAMADOL HYDROCHLORIDE 50 MG/1
TABLET ORAL
Qty: 120 TABLET | Refills: 0 | Status: SHIPPED | OUTPATIENT
Start: 2020-12-21 | End: 2021-02-06

## 2020-12-22 NOTE — TELEPHONE ENCOUNTER
Message noted: Chart reviewed and may refill medications as requested. Script sent to listed pharmacy by secure method. Please notify pt. If patient is going to be staying in [address] [place] [address] permanently needs to seek new provider for future scripts.

## 2021-02-06 RX ORDER — ALPRAZOLAM 1 MG/1
TABLET ORAL
Qty: 60 TABLET | Refills: 0 | Status: SHIPPED | OUTPATIENT
Start: 2021-02-06 | End: 2021-03-15

## 2021-02-06 RX ORDER — TRAMADOL HYDROCHLORIDE 50 MG/1
TABLET ORAL
Qty: 120 TABLET | Refills: 0 | Status: SHIPPED | OUTPATIENT
Start: 2021-02-06 | End: 2021-03-15

## 2021-02-06 NOTE — TELEPHONE ENCOUNTER
Message noted: Chart reviewed and may refill medications as requested. Script sent to listed pharmacy by secure method.     Pharmacy to notify

## 2021-03-15 RX ORDER — TRAMADOL HYDROCHLORIDE 50 MG/1
TABLET ORAL
Qty: 120 TABLET | Refills: 0 | Status: SHIPPED | OUTPATIENT
Start: 2021-03-15 | End: 2021-04-14

## 2021-03-15 RX ORDER — ALPRAZOLAM 1 MG/1
TABLET ORAL
Qty: 60 TABLET | Refills: 0 | Status: SHIPPED | OUTPATIENT
Start: 2021-03-15 | End: 2021-04-14

## 2021-04-14 RX ORDER — TRAMADOL HYDROCHLORIDE 50 MG/1
TABLET ORAL
Qty: 120 TABLET | Refills: 0 | Status: SHIPPED | OUTPATIENT
Start: 2021-04-14 | End: 2021-05-12

## 2021-04-14 RX ORDER — ALPRAZOLAM 1 MG/1
TABLET ORAL
Qty: 60 TABLET | Refills: 0 | Status: SHIPPED | OUTPATIENT
Start: 2021-04-14 | End: 2021-05-12

## 2021-05-12 ENCOUNTER — TELEPHONE (OUTPATIENT)
Dept: FAMILY MEDICINE CLINIC | Facility: CLINIC | Age: 31
End: 2021-05-12

## 2021-05-12 RX ORDER — ALPRAZOLAM 1 MG/1
TABLET ORAL
Qty: 60 TABLET | Refills: 0 | Status: SHIPPED | OUTPATIENT
Start: 2021-05-12 | End: 2021-06-10

## 2021-05-12 RX ORDER — TRAMADOL HYDROCHLORIDE 50 MG/1
TABLET ORAL
Qty: 120 TABLET | Refills: 0 | Status: SHIPPED | OUTPATIENT
Start: 2021-05-12 | End: 2021-06-10

## 2021-05-12 NOTE — TELEPHONE ENCOUNTER
Called pt name and date of birth verified informed patient of medication approved and refilled by doctor Ramón and sent to pharmacy.  Verified pharmacy with pt, per pt verbalized understanding no further questions

## 2021-06-10 RX ORDER — ALPRAZOLAM 1 MG/1
TABLET ORAL
Qty: 60 TABLET | Refills: 0 | Status: SHIPPED | OUTPATIENT
Start: 2021-06-10 | End: 2021-07-06

## 2021-06-10 RX ORDER — TRAMADOL HYDROCHLORIDE 50 MG/1
TABLET ORAL
Qty: 120 TABLET | Refills: 0 | Status: SHIPPED | OUTPATIENT
Start: 2021-06-10 | End: 2021-07-06

## 2021-07-06 RX ORDER — TRAMADOL HYDROCHLORIDE 50 MG/1
TABLET ORAL
Qty: 120 TABLET | Refills: 0 | Status: SHIPPED | OUTPATIENT
Start: 2021-07-06 | End: 2021-08-04

## 2021-07-06 RX ORDER — ALPRAZOLAM 1 MG/1
TABLET ORAL
Qty: 60 TABLET | Refills: 0 | Status: SHIPPED | OUTPATIENT
Start: 2021-07-06 | End: 2021-08-04

## 2021-08-04 RX ORDER — TRAMADOL HYDROCHLORIDE 50 MG/1
TABLET ORAL
Qty: 120 TABLET | Refills: 0 | Status: SHIPPED | OUTPATIENT
Start: 2021-08-04 | End: 2021-09-03

## 2021-08-04 RX ORDER — ALPRAZOLAM 1 MG/1
TABLET ORAL
Qty: 60 TABLET | Refills: 0 | Status: SHIPPED | OUTPATIENT
Start: 2021-08-04 | End: 2021-09-03

## 2021-08-04 NOTE — TELEPHONE ENCOUNTER
Please review.  Protocol failed/No protocol      Requested Prescriptions   Pending Prescriptions Disp Refills    TRAMADOL 50 MG Oral Tab [Pharmacy Med Name: Tramadol Hcl 50 Mg Tab Sunp] 120 tablet 0     Sig: TAKE ONE TABLET BY MOUTH EVERY SIX HOURS AS NEEDED FOR PAIN        There is no refill protocol information for this order        ALPRAZOLAM 1 MG Oral Tab [Pharmacy Med Name: Alprazolam 1 Mg Tab Acta] 60 tablet 0     Sig: TAKE ONE TABLET BY MOUTH TWICE DAILY AS NEEDED FOR ANXIETY        There is no refill protocol information for this order           Recent Outpatient Visits              1 year ago Chronic back pain, unspecified back location, unspecified back pain laterality    Mala Hutson MD    Office Visit    2 years ago Lumbar radiculopathy    [facility] Dona Hensley MD    Office Visit    2 years ago Chronic low back pain with right-sided sciatica, unspecified back pain laterality    Mala Hutson MD    Office Visit    3 years ago Lorelle Kawasaki, MD    Office Visit    4 years ago Dorsalgia of lumbar region    Mala Hutson MD    Office Visit

## 2021-09-03 RX ORDER — ALPRAZOLAM 1 MG/1
TABLET ORAL
Qty: 60 TABLET | Refills: 0 | Status: SHIPPED | OUTPATIENT
Start: 2021-09-03 | End: 2021-10-04

## 2021-09-03 RX ORDER — TRAMADOL HYDROCHLORIDE 50 MG/1
TABLET ORAL
Qty: 120 TABLET | Refills: 0 | Status: SHIPPED | OUTPATIENT
Start: 2021-09-03 | End: 2021-10-04

## 2021-09-04 NOTE — TELEPHONE ENCOUNTER
Message noted: Chart reviewed and may refill medication as requested. Script sent to listed pharmacy by secure method.     Pt notified through [facility]

## 2021-10-04 ENCOUNTER — TELEPHONE (OUTPATIENT)
Dept: FAMILY MEDICINE CLINIC | Facility: CLINIC | Age: 31
End: 2021-10-04

## 2021-10-04 RX ORDER — TRAMADOL HYDROCHLORIDE 50 MG/1
TABLET ORAL
Qty: 120 TABLET | Refills: 0 | Status: SHIPPED
Start: 2021-10-04 | End: 2021-11-03

## 2021-10-04 RX ORDER — ALPRAZOLAM 1 MG/1
TABLET ORAL
Qty: 60 TABLET | Refills: 0 | Status: SHIPPED
Start: 2021-10-04 | End: 2021-11-03

## 2021-10-04 NOTE — TELEPHONE ENCOUNTER
Message noted: Chart reviewed and may refill medications as requested. Script sent to listed pharmacy by secure method.     Pt notified through [facility]

## 2021-10-07 ENCOUNTER — OFFICE VISIT (OUTPATIENT)
Dept: FAMILY MEDICINE CLINIC | Facility: CLINIC | Age: 31
End: 2021-10-07
Payer: MEDICAID

## 2021-10-07 VITALS
HEART RATE: 67 BPM | HEIGHT: 76 IN | WEIGHT: 168 LBS | DIASTOLIC BLOOD PRESSURE: 80 MMHG | BODY MASS INDEX: 20.46 KG/M2 | SYSTOLIC BLOOD PRESSURE: 97 MMHG | TEMPERATURE: 98 F | RESPIRATION RATE: 18 BRPM

## 2021-10-07 DIAGNOSIS — Z00.00 ROUTINE PHYSICAL EXAMINATION: ICD-10-CM

## 2021-10-07 DIAGNOSIS — F41.9 ANXIETY: ICD-10-CM

## 2021-10-07 DIAGNOSIS — M54.9 CHRONIC BACK PAIN, UNSPECIFIED BACK LOCATION, UNSPECIFIED BACK PAIN LATERALITY: Primary | ICD-10-CM

## 2021-10-07 DIAGNOSIS — G89.29 CHRONIC BACK PAIN, UNSPECIFIED BACK LOCATION, UNSPECIFIED BACK PAIN LATERALITY: Primary | ICD-10-CM

## 2021-10-07 PROCEDURE — 3008F BODY MASS INDEX DOCD: CPT | Performed by: FAMILY MEDICINE

## 2021-10-07 PROCEDURE — 3074F SYST BP LT 130 MM HG: CPT | Performed by: FAMILY MEDICINE

## 2021-10-07 PROCEDURE — 3079F DIAST BP 80-89 MM HG: CPT | Performed by: FAMILY MEDICINE

## 2021-10-07 PROCEDURE — 99395 PREV VISIT EST AGE 18-39: CPT | Performed by: FAMILY MEDICINE

## 2021-10-07 NOTE — PROGRESS NOTES
Subjective:   Patient ID: Chester Richards is a 32year old male. Patient is here for follow up for chronic medical issues- chronic pain and anxiety. The patient is compliant with medications and no side effects.  There are no acute issues and patien Mental Status: He is alert. Psychiatric:         Mood and Affect: Mood normal.         Behavior: Behavior normal.         Thought Content:  Thought content normal.         Judgment: Judgment normal.         Assessment & Plan:   Chronic back pain, unspecif

## 2021-11-03 RX ORDER — ALPRAZOLAM 1 MG/1
1 TABLET ORAL 2 TIMES DAILY PRN
Qty: 60 TABLET | Refills: 0 | Status: SHIPPED | OUTPATIENT
Start: 2021-11-03 | End: 2021-12-01

## 2021-11-03 RX ORDER — TRAMADOL HYDROCHLORIDE 50 MG/1
TABLET ORAL
Qty: 120 TABLET | Refills: 0 | Status: SHIPPED | OUTPATIENT
Start: 2021-11-03 | End: 2021-12-01

## 2021-11-03 RX ORDER — TRAMADOL HYDROCHLORIDE 50 MG/1
TABLET ORAL
Qty: 120 TABLET | Refills: 0 | OUTPATIENT
Start: 2021-11-03

## 2021-11-03 RX ORDER — ALPRAZOLAM 1 MG/1
TABLET ORAL
Qty: 60 TABLET | Refills: 0 | OUTPATIENT
Start: 2021-11-03

## 2021-11-03 RX ORDER — ALPRAZOLAM 1 MG/1
1 TABLET ORAL 2 TIMES DAILY PRN
Qty: 60 TABLET | Refills: 0 | Status: CANCELLED | OUTPATIENT
Start: 2021-11-03

## 2021-11-03 NOTE — TELEPHONE ENCOUNTER
Please review. Protocol failed/ No protocol      Requested Prescriptions   Pending Prescriptions Disp Refills    ALPRAZolam 1 MG Oral Tab 60 tablet 0     Sig: Take 1 tablet (1 mg total) by mouth 2 (two) times daily as needed for Anxiety.         There is no

## 2021-11-03 NOTE — TELEPHONE ENCOUNTER
Message noted: Chart reviewed and may refill tramadol as requested. Script sent to listed pharmacy by secure method.     Pt notified through [facility]

## 2021-11-03 NOTE — TELEPHONE ENCOUNTER
Please review. Protocol Failed / No Protocol.     Requested Prescriptions   Pending Prescriptions Disp Refills    ALPRAZOLAM 1 MG Oral Tab [Pharmacy Med Name: ALPRAZOLAM 1MG TABLETS] 60 tablet 0     Sig: TAKE 1 TABLET BY MOUTH TWICE DAILY AS NEEDED FOR ANX

## 2021-11-03 NOTE — TELEPHONE ENCOUNTER
Patient's mother is requesting Alprazolam to be sent to [facility] and she is requesting a refill of Tramadol to [place].    TRAMADOL 50 MG Oral Tab 120 tablet 0 10/4/2021    Sig:   TAKE ONE TABLET BY MOUTH EVERY SIX HOURS AS NEEDED FOR PAIN NEEDS APPOINTMENT FOR F

## 2021-11-03 NOTE — TELEPHONE ENCOUNTER
Message noted: Chart reviewed and may refill medication as requested. Script sent to listed pharmacy by secure method.     Pt notified through [facility]

## 2021-12-01 RX ORDER — TRAMADOL HYDROCHLORIDE 50 MG/1
TABLET ORAL
Qty: 120 TABLET | Refills: 0 | Status: SHIPPED | OUTPATIENT
Start: 2021-12-01 | End: 2022-01-03

## 2021-12-01 RX ORDER — ALPRAZOLAM 1 MG/1
TABLET ORAL
Qty: 60 TABLET | Refills: 0 | Status: SHIPPED | OUTPATIENT
Start: 2021-12-01 | End: 2022-01-03

## 2021-12-01 NOTE — TELEPHONE ENCOUNTER
Message noted: Chart reviewed and may refill medication as requested. Script sent to listed pharmacy by secure method.     Pt notified through [facility]

## 2021-12-01 NOTE — TELEPHONE ENCOUNTER
Please review. Protocol Failed / No Protocol.     Requested Prescriptions   Pending Prescriptions Disp Refills    ALPRAZOLAM 1 MG Oral Tab [Pharmacy Med Name: Alprazolam 1 Mg Tab Acta] 60 tablet 0     Sig: TAKE ONE TABLET BY MOUTH TWICE DAILY AS NEEDED FOR

## 2022-01-03 RX ORDER — ALPRAZOLAM 1 MG/1
TABLET ORAL
Qty: 60 TABLET | Refills: 0 | Status: SHIPPED | OUTPATIENT
Start: 2022-01-03 | End: 2022-02-02

## 2022-01-03 RX ORDER — TRAMADOL HYDROCHLORIDE 50 MG/1
TABLET ORAL
Qty: 120 TABLET | Refills: 0 | Status: SHIPPED | OUTPATIENT
Start: 2022-01-03 | End: 2022-02-02

## 2022-01-03 NOTE — TELEPHONE ENCOUNTER
Message noted: Chart reviewed and may refill medications as requested. Script sent to listed pharmacy by secure method.     Pt notified through [facility]

## 2022-01-06 ENCOUNTER — TELEPHONE (OUTPATIENT)
Dept: FAMILY MEDICINE CLINIC | Facility: CLINIC | Age: 32
End: 2022-01-06

## 2022-01-06 DIAGNOSIS — Z20.822 ENCOUNTER FOR LABORATORY TESTING FOR COVID-19 VIRUS: Primary | ICD-10-CM

## 2022-01-06 NOTE — TELEPHONE ENCOUNTER
Patient's mom (on [place]) states patient was exposed by a coworker to COVID virus. Patient is asymptomatic. Per protocol Alinity test ordered and advised to test 5 days after exposure.

## 2022-02-02 RX ORDER — TRAMADOL HYDROCHLORIDE 50 MG/1
TABLET ORAL
Qty: 120 TABLET | Refills: 0 | Status: SHIPPED | OUTPATIENT
Start: 2022-02-02 | End: 2022-03-03

## 2022-02-02 RX ORDER — ALPRAZOLAM 1 MG/1
TABLET ORAL
Qty: 60 TABLET | Refills: 0 | Status: SHIPPED | OUTPATIENT
Start: 2022-02-02 | End: 2022-03-03

## 2022-02-02 NOTE — TELEPHONE ENCOUNTER
Please review. Protocol Failed / No Protocol.     Requested Prescriptions   Pending Prescriptions Disp Refills    ALPRAZOLAM 1 MG Oral Tab [Pharmacy Med Name: Alprazolam 1 Mg Tab Acta] 60 tablet 0     Sig: TAKE ONE TABLET BY MOUTH TWICE DAILY AS NEEDED FOR ANXIETY **APPOINTMENT NEEDED FOR FURTHER REFILLS**        There is no refill protocol information for this order        TRAMADOL 50 MG Oral Tab [Pharmacy Med Name: Tramadol Hcl 50 Mg Tab Sunp] 120 tablet 0     Sig: TAKE ONE TABLET BY MOUTH EVERY SIX HOURS AS NEEDED FOR PAIN        There is no refill protocol information for this order           Recent Outpatient Visits              3 months ago Chronic back pain, unspecified back location, unspecified back pain laterality    Sally Willis MD    Office Visit    1 year ago Chronic back pain, unspecified back location, unspecified back pain laterality    Sally Willis MD    Office Visit    2 years ago Lumbar radiculopathy    [facility] Vida Patten MD    Office Visit    2 years ago Chronic low back pain with right-sided sciatica, unspecified back pain laterality    Sally Willis MD    Office Visit    4 years ago [employer], Radha Spencer MD    Office Visit

## 2022-03-03 RX ORDER — TRAMADOL HYDROCHLORIDE 50 MG/1
TABLET ORAL
Qty: 120 TABLET | Refills: 0 | Status: SHIPPED | OUTPATIENT
Start: 2022-03-03 | End: 2022-04-02

## 2022-03-03 RX ORDER — ALPRAZOLAM 1 MG/1
TABLET ORAL
Qty: 60 TABLET | Refills: 0 | Status: SHIPPED | OUTPATIENT
Start: 2022-03-03 | End: 2022-04-02

## 2022-03-03 NOTE — TELEPHONE ENCOUNTER
Protocol failed or has No Protocol, please review  Requested Prescriptions   Pending Prescriptions Disp Refills    ALPRAZOLAM 1 MG Oral Tab [Pharmacy Med Name: Alprazolam 1 Mg Tab Acta] 60 tablet 0     Sig: TAKE ONE TABLET BY MOUTH TWICE DAILY AS NEEDED FOR ANXIETY **APPOINTMENT NEEDED FOR FURTHER REFILLS**        There is no refill protocol information for this order        TRAMADOL 50 MG Oral Tab [Pharmacy Med Name: Tramadol Hcl 50 Mg Tab Sunp] 120 tablet 0     Sig: TAKE ONE TABLET BY MOUTH EVERY SIX HOURS AS NEEDED FOR PAIN        There is no refill protocol information for this order           Recent Outpatient Visits              4 months ago Chronic back pain, unspecified back location, unspecified back pain laterality    Dre Evans MD    Office Visit    1 year ago Chronic back pain, unspecified back location, unspecified back pain laterality    Dre Evans MD    Office Visit    2 years ago Lumbar radiculopathy    [facility] Trudy Stratton MD    Office Visit    2 years ago Chronic low back pain with right-sided sciatica, unspecified back pain laterality    Dre Evans MD    Office Visit    4 years ago Karyl Severs, MD    Office Visit

## 2022-03-04 NOTE — TELEPHONE ENCOUNTER
Message noted: Chart reviewed and may refill medications as requested. Script sent to listed pharmacy by secure method.     Pt notified through [facility]

## 2022-04-02 RX ORDER — TRAMADOL HYDROCHLORIDE 50 MG/1
TABLET ORAL
Qty: 120 TABLET | Refills: 0 | Status: SHIPPED | OUTPATIENT
Start: 2022-04-02

## 2022-04-02 RX ORDER — ALPRAZOLAM 1 MG/1
TABLET ORAL
Qty: 60 TABLET | Refills: 0 | Status: SHIPPED | OUTPATIENT
Start: 2022-04-02

## 2022-04-02 NOTE — TELEPHONE ENCOUNTER
Message noted: Chart reviewed and may refill medications as requested. Script sent to listed pharmacy by secure method.     Pt notified through [facility]

## 2022-04-02 NOTE — TELEPHONE ENCOUNTER
Please review refill protocol failed/ no protocol  Requested Prescriptions   Pending Prescriptions Disp Refills    TRAMADOL 50 MG Oral Tab [Pharmacy Med Name: Tramadol Hcl 50 Mg Tab Sunp] 120 tablet 0     Sig: TAKE ONE TABLET BY MOUTH EVERY SIX HOURS AS NEEDED FOR PAIN        There is no refill protocol information for this order        ALPRAZOLAM 1 MG Oral Tab [Pharmacy Med Name: Alprazolam 1 Mg Tab Acta] 60 tablet 0     Sig: TAKE ONE TABLET BY MOUTH TWICE DAILY AS NEEDED FOR ANXIETY **APPOINTMENT NEEDED FOR FURTHER REFILLS**        There is no refill protocol information for this order

## 2022-04-11 ENCOUNTER — OFFICE VISIT (OUTPATIENT)
Dept: FAMILY MEDICINE CLINIC | Facility: CLINIC | Age: 32
End: 2022-04-11
Payer: MEDICAID

## 2022-04-11 VITALS
SYSTOLIC BLOOD PRESSURE: 122 MMHG | DIASTOLIC BLOOD PRESSURE: 70 MMHG | HEIGHT: 76 IN | HEART RATE: 67 BPM | WEIGHT: 169 LBS | BODY MASS INDEX: 20.58 KG/M2

## 2022-04-11 DIAGNOSIS — R22.1 LUMP IN NECK: ICD-10-CM

## 2022-04-11 DIAGNOSIS — M54.40 ACUTE BILATERAL LOW BACK PAIN WITH SCIATICA, SCIATICA LATERALITY UNSPECIFIED: Primary | ICD-10-CM

## 2022-04-11 PROCEDURE — 3008F BODY MASS INDEX DOCD: CPT

## 2022-04-11 PROCEDURE — 3078F DIAST BP <80 MM HG: CPT

## 2022-04-11 PROCEDURE — 3074F SYST BP LT 130 MM HG: CPT

## 2022-04-11 PROCEDURE — 99213 OFFICE O/P EST LOW 20 MIN: CPT

## 2022-04-11 RX ORDER — NAPROXEN 500 MG/1
500 TABLET ORAL 2 TIMES DAILY WITH MEALS
Qty: 28 TABLET | Refills: 0 | Status: SHIPPED | OUTPATIENT
Start: 2022-04-11

## 2022-04-11 RX ORDER — CYCLOBENZAPRINE HCL 5 MG
5 TABLET ORAL NIGHTLY
Qty: 14 TABLET | Refills: 0 | Status: SHIPPED | OUTPATIENT
Start: 2022-04-11

## 2022-04-12 ENCOUNTER — TELEPHONE (OUTPATIENT)
Dept: FAMILY MEDICINE CLINIC | Facility: CLINIC | Age: 32
End: 2022-04-12

## 2022-04-12 NOTE — TELEPHONE ENCOUNTER
Per Iliana with central scheduling, the soonest patient is able to get in for his ultrasound is 4/26. Patient is under the impression that he should schedule the ultrasound ASAP.  Please advise

## 2022-04-12 NOTE — TELEPHONE ENCOUNTER
FYI: talked to patient told him message below and understood. Left message also with Iliana central Scheduling.

## 2022-04-19 ENCOUNTER — HOSPITAL ENCOUNTER (OUTPATIENT)
Dept: ULTRASOUND IMAGING | Age: 32
Discharge: HOME | End: 2022-04-19
Payer: MEDICAID

## 2022-04-19 DIAGNOSIS — R22.1 LUMP IN NECK: ICD-10-CM

## 2022-04-19 PROCEDURE — 76536 US EXAM OF HEAD AND NECK: CPT

## 2022-04-21 NOTE — TELEPHONE ENCOUNTER
Spoke with [facility] pharmacist and last fill was 12/15/2016, pharmacist states Alprazolam 1 mg tab is going through patient insurance and they will notify him when medication is ready for .   -Spoke with patient and informed medication was able to be ref POSITIVE

## 2022-05-02 RX ORDER — ALPRAZOLAM 1 MG/1
TABLET ORAL
Qty: 60 TABLET | Refills: 0 | Status: SHIPPED | OUTPATIENT
Start: 2022-05-02

## 2022-05-02 RX ORDER — TRAMADOL HYDROCHLORIDE 50 MG/1
TABLET ORAL
Qty: 120 TABLET | Refills: 0 | Status: SHIPPED | OUTPATIENT
Start: 2022-05-02

## 2022-05-02 NOTE — TELEPHONE ENCOUNTER
Message noted: Chart reviewed and may refill medications as requested. Script sent to listed pharmacy by secure method.     Pt notified through [facility]

## 2022-06-02 RX ORDER — TRAMADOL HYDROCHLORIDE 50 MG/1
TABLET ORAL
Qty: 120 TABLET | Refills: 0 | Status: SHIPPED | OUTPATIENT
Start: 2022-06-02

## 2022-06-02 RX ORDER — ALPRAZOLAM 1 MG/1
TABLET ORAL
Qty: 60 TABLET | Refills: 0 | Status: SHIPPED | OUTPATIENT
Start: 2022-06-02

## 2022-06-02 NOTE — TELEPHONE ENCOUNTER
Message noted: Chart reviewed and may refill medications as requested. Script sent to listed pharmacy by secure method.     Pt notified through [facility]

## 2022-06-06 ENCOUNTER — OFFICE VISIT (OUTPATIENT)
Dept: FAMILY MEDICINE CLINIC | Facility: CLINIC | Age: 32
End: 2022-06-06
Payer: MEDICAID

## 2022-06-06 VITALS
BODY MASS INDEX: 19.97 KG/M2 | HEART RATE: 74 BPM | HEIGHT: 76 IN | DIASTOLIC BLOOD PRESSURE: 72 MMHG | WEIGHT: 164 LBS | SYSTOLIC BLOOD PRESSURE: 114 MMHG

## 2022-06-06 DIAGNOSIS — R59.0 LYMPHADENOPATHY, SUBMANDIBULAR: ICD-10-CM

## 2022-06-06 DIAGNOSIS — Z13.220 ENCOUNTER FOR LIPID SCREENING FOR CARDIOVASCULAR DISEASE: ICD-10-CM

## 2022-06-06 DIAGNOSIS — R53.83 OTHER FATIGUE: Primary | ICD-10-CM

## 2022-06-06 DIAGNOSIS — Z13.6 ENCOUNTER FOR LIPID SCREENING FOR CARDIOVASCULAR DISEASE: ICD-10-CM

## 2022-06-06 PROCEDURE — 3008F BODY MASS INDEX DOCD: CPT

## 2022-06-06 PROCEDURE — 3078F DIAST BP <80 MM HG: CPT

## 2022-06-06 PROCEDURE — 99213 OFFICE O/P EST LOW 20 MIN: CPT

## 2022-06-06 PROCEDURE — 3074F SYST BP LT 130 MM HG: CPT

## 2022-06-07 ENCOUNTER — HOSPITAL ENCOUNTER (OUTPATIENT)
Dept: GENERAL RADIOLOGY | Age: 32
Discharge: HOME | End: 2022-06-07
Payer: MEDICAID

## 2022-06-07 DIAGNOSIS — R59.0 LYMPHADENOPATHY, SUBMANDIBULAR: ICD-10-CM

## 2022-06-07 PROCEDURE — 71046 X-RAY EXAM CHEST 2 VIEWS: CPT

## 2022-06-24 ENCOUNTER — TELEPHONE (OUTPATIENT)
Dept: FAMILY MEDICINE CLINIC | Facility: CLINIC | Age: 32
End: 2022-06-24

## 2022-06-28 ENCOUNTER — TELEPHONE (OUTPATIENT)
Dept: FAMILY MEDICINE CLINIC | Facility: CLINIC | Age: 32
End: 2022-06-28

## 2022-06-28 RX ORDER — TRAMADOL HYDROCHLORIDE 50 MG/1
TABLET ORAL
Qty: 120 TABLET | Refills: 0 | Status: SHIPPED | OUTPATIENT
Start: 2022-06-28

## 2022-06-28 RX ORDER — ALPRAZOLAM 1 MG/1
TABLET ORAL
Qty: 60 TABLET | Refills: 0 | Status: SHIPPED | OUTPATIENT
Start: 2022-06-28

## 2022-06-28 NOTE — TELEPHONE ENCOUNTER
Message noted: Chart reviewed and may refill medications as requested. Script sent to listed pharmacy by secure method.     Pt notified through [facility]

## 2022-07-15 ENCOUNTER — APPOINTMENT (OUTPATIENT)
Dept: GENERAL RADIOLOGY | Age: 32
End: 2022-07-15
Attending: NURSE PRACTITIONER
Payer: MEDICAID

## 2022-07-15 ENCOUNTER — HOSPITAL ENCOUNTER (OUTPATIENT)
Age: 32
Discharge: HOME | End: 2022-07-15
Payer: MEDICAID

## 2022-07-15 VITALS
BODY MASS INDEX: 20.7 KG/M2 | TEMPERATURE: 98 F | HEART RATE: 79 BPM | RESPIRATION RATE: 18 BRPM | HEIGHT: 76 IN | SYSTOLIC BLOOD PRESSURE: 127 MMHG | DIASTOLIC BLOOD PRESSURE: 77 MMHG | WEIGHT: 170 LBS | OXYGEN SATURATION: 97 %

## 2022-07-15 DIAGNOSIS — S60.052A CONTUSION OF LEFT LITTLE FINGER WITHOUT DAMAGE TO NAIL, INITIAL ENCOUNTER: Primary | ICD-10-CM

## 2022-07-15 DIAGNOSIS — S60.211A CONTUSION OF RIGHT WRIST, INITIAL ENCOUNTER: ICD-10-CM

## 2022-07-15 PROCEDURE — 73110 X-RAY EXAM OF WRIST: CPT | Performed by: NURSE PRACTITIONER

## 2022-07-15 PROCEDURE — 73130 X-RAY EXAM OF HAND: CPT | Performed by: NURSE PRACTITIONER

## 2022-07-15 PROCEDURE — 99213 OFFICE O/P EST LOW 20 MIN: CPT

## 2022-07-15 PROCEDURE — 99203 OFFICE O/P NEW LOW 30 MIN: CPT

## 2022-07-15 NOTE — ED INITIAL ASSESSMENT (HPI)
Left 5th digit finger and right wrist injury today while at work. 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

## 2022-07-26 RX ORDER — ALPRAZOLAM 1 MG/1
1 TABLET ORAL 2 TIMES DAILY PRN
Qty: 60 TABLET | Refills: 0 | Status: SHIPPED | OUTPATIENT
Start: 2022-07-26

## 2022-07-26 RX ORDER — TRAMADOL HYDROCHLORIDE 50 MG/1
50 TABLET ORAL EVERY 6 HOURS PRN
Qty: 120 TABLET | Refills: 0 | Status: SHIPPED | OUTPATIENT
Start: 2022-07-26

## 2022-08-29 RX ORDER — TRAMADOL HYDROCHLORIDE 50 MG/1
50 TABLET ORAL EVERY 6 HOURS PRN
Qty: 120 TABLET | Refills: 0 | Status: SHIPPED | OUTPATIENT
Start: 2022-08-29

## 2022-08-29 RX ORDER — ALPRAZOLAM 1 MG/1
1 TABLET ORAL 2 TIMES DAILY PRN
Qty: 60 TABLET | Refills: 0 | Status: SHIPPED | OUTPATIENT
Start: 2022-08-29

## 2022-08-29 NOTE — TELEPHONE ENCOUNTER
Please review. Protocol failed / No Protocol. Requested Prescriptions   Pending Prescriptions Disp Refills    traMADol 50 MG Oral Tab 120 tablet 0     Sig: Take 1 tablet (50 mg total) by mouth every 6 (six) hours as needed for Pain. There is no refill protocol information for this order        ALPRAZolam 1 MG Oral Tab 60 tablet 0     Sig: Take 1 tablet (1 mg total) by mouth 2 (two) times daily as needed for Anxiety.         There is no refill protocol information for this order            Recent Outpatient Visits              2 months ago Other fatigue    Giulia Thapa, Sana Bragg APRN    Office Visit    4 months ago Acute bilateral low back pain with sciatica, sciatica laterality unspecified    [facility], [address] Ruston, APRN    Office Visit    10 months ago Chronic back pain, unspecified back location, unspecified back pain laterality    Colton Dennis MD    Office Visit    2 years ago Chronic back pain, unspecified back location, unspecified back pain laterality    Colton Dennis MD    Office Visit    3 years ago Lumbar radiculopathy    [facility] Wolfgang Watt MD    Office Visit

## 2022-09-26 RX ORDER — TRAMADOL HYDROCHLORIDE 50 MG/1
50 TABLET ORAL EVERY 6 HOURS PRN
Qty: 120 TABLET | Refills: 0 | Status: SHIPPED | OUTPATIENT
Start: 2022-09-26

## 2022-09-26 RX ORDER — ALPRAZOLAM 1 MG/1
1 TABLET ORAL 2 TIMES DAILY PRN
Qty: 60 TABLET | Refills: 0 | Status: SHIPPED | OUTPATIENT
Start: 2022-09-26

## 2022-09-26 NOTE — TELEPHONE ENCOUNTER
Message noted: Chart reviewed and may refill medications as requested. Script sent to listed pharmacy by secure method.     Pt notified through [facility]

## 2022-09-26 NOTE — TELEPHONE ENCOUNTER
Protocol failed or has No Protocol, please review  Requested Prescriptions   Pending Prescriptions Disp Refills    traMADol 50 MG Oral Tab 120 tablet 0     Sig: Take 1 tablet (50 mg total) by mouth every 6 (six) hours as needed for Pain. There is no refill protocol information for this order        ALPRAZolam 1 MG Oral Tab 60 tablet 0     Sig: Take 1 tablet (1 mg total) by mouth 2 (two) times daily as needed for Anxiety.         There is no refill protocol information for this order           Recent Outpatient Visits              3 months ago Other fatigue    Giulia [address], Sana Bragg APRN    Office Visit    5 months ago Acute bilateral low back pain with sciatica, sciatica laterality unspecified    [facility], [facility], [address] Rust[address], APRN    Office Visit    11 months ago Chronic back pain, unspecified back location, unspecified back pain laterality    Luis Bustamante MD    Office Visit    2 years ago Chronic back pain, unspecified back location, unspecified back pain laterality    Luis Bustamante MD    Office Visit    3 years ago Lumbar radiculopathy    [facility] Kel Scanlon MD    Office Visit

## 2022-10-25 RX ORDER — TRAMADOL HYDROCHLORIDE 50 MG/1
50 TABLET ORAL EVERY 6 HOURS PRN
Qty: 120 TABLET | Refills: 0 | Status: SHIPPED | OUTPATIENT
Start: 2022-10-25

## 2022-10-25 RX ORDER — ALPRAZOLAM 1 MG/1
1 TABLET ORAL 2 TIMES DAILY PRN
Qty: 60 TABLET | Refills: 0 | Status: SHIPPED | OUTPATIENT
Start: 2022-10-25

## 2022-10-25 NOTE — TELEPHONE ENCOUNTER
Message noted: Chart reviewed and may refill medications as requested. Script sent to listed pharmacy by secure method.     Pt notified through [facility]

## 2022-10-25 NOTE — TELEPHONE ENCOUNTER
Protocol failed or has No Protocol, please review  Last office visit 6/6/2022    Requested Prescriptions   Pending Prescriptions Disp Refills    traMADol 50 MG Oral Tab 120 tablet 0     Sig: Take 1 tablet (50 mg total) by mouth every 6 (six) hours as needed for Pain. There is no refill protocol information for this order        ALPRAZolam 1 MG Oral Tab 60 tablet 0     Sig: Take 1 tablet (1 mg total) by mouth 2 (two) times daily as needed for Anxiety.         There is no refill protocol information for this order           Recent Outpatient Visits              4 months ago Other fatigue    [address] [address] Brinda Gallus, KANDICE    Office Visit    6 months ago Acute bilateral low back pain with sciatica, sciatica laterality unspecified    [address] [place], [address] Brinda Gallus, APRN    Office Visit    1 year ago Chronic back pain, unspecified back location, unspecified back pain laterality    Lidia Amador MD    Office Visit    2 years ago Chronic back pain, unspecified back location, unspecified back pain laterality    Lidia Amador MD    Office Visit    3 years ago Lumbar radiculopathy    [facility] Josseline Talbot MD    Office Visit

## 2022-11-21 RX ORDER — TRAMADOL HYDROCHLORIDE 50 MG/1
50 TABLET ORAL EVERY 6 HOURS PRN
Qty: 120 TABLET | Refills: 0 | Status: SHIPPED | OUTPATIENT
Start: 2022-11-21

## 2022-11-21 RX ORDER — ALPRAZOLAM 1 MG/1
1 TABLET ORAL 2 TIMES DAILY PRN
Qty: 60 TABLET | Refills: 0 | Status: SHIPPED | OUTPATIENT
Start: 2022-11-21

## 2022-11-21 NOTE — TELEPHONE ENCOUNTER
Message noted: Chart reviewed and may refill medication as requested times one. Prescription sent to listed pharmacy. Pharmacy to notify patient to make appointment for further refills  Pt notified through [facility] also.

## 2022-11-21 NOTE — TELEPHONE ENCOUNTER
Protocol failed or has No Protocol, please review  Requested Prescriptions   Pending Prescriptions Disp Refills    traMADol 50 MG Oral Tab 120 tablet 0     Sig: Take 1 tablet (50 mg total) by mouth every 6 (six) hours as needed for Pain. There is no refill protocol information for this order       ALPRAZolam 1 MG Oral Tab 60 tablet 0     Sig: Take 1 tablet (1 mg total) by mouth 2 (two) times daily as needed for Anxiety.        There is no refill protocol information for this order          Recent Outpatient Visits              5 months ago Other fatigue    Giulia [address], Maggie Bragg APRN    Office Visit    7 months ago Acute bilateral low back pain with sciatica, sciatica laterality unspecified    [address] [place], [address] Wendy Ort, APRN    Office Visit    1 year ago Chronic back pain, unspecified back location, unspecified back pain laterality    Rosario Resendez MD    Office Visit    2 years ago Chronic back pain, unspecified back location, unspecified back pain laterality    Rosario Resendez MD    Office Visit    3 years ago Lumbar radiculopathy    [facility] Sanjuana Ornelas MD    Office Visit

## 2022-12-15 RX ORDER — TRAMADOL HYDROCHLORIDE 50 MG/1
50 TABLET ORAL EVERY 6 HOURS PRN
Qty: 120 TABLET | Refills: 0 | Status: SHIPPED | OUTPATIENT
Start: 2022-12-15

## 2022-12-15 RX ORDER — ALPRAZOLAM 1 MG/1
1 TABLET ORAL 2 TIMES DAILY PRN
Qty: 60 TABLET | Refills: 0 | Status: SHIPPED | OUTPATIENT
Start: 2022-12-15

## 2022-12-15 NOTE — TELEPHONE ENCOUNTER
Message noted: Chart reviewed and may refill medications as requested. Script sent to listed pharmacy by secure method.     Pt notified through [facility]

## 2023-01-11 RX ORDER — TRAMADOL HYDROCHLORIDE 50 MG/1
50 TABLET ORAL EVERY 6 HOURS PRN
Qty: 120 TABLET | Refills: 0 | Status: SHIPPED | OUTPATIENT
Start: 2023-01-11

## 2023-01-11 RX ORDER — ALPRAZOLAM 1 MG/1
1 TABLET ORAL 2 TIMES DAILY PRN
Qty: 60 TABLET | Refills: 0 | Status: SHIPPED | OUTPATIENT
Start: 2023-01-11

## 2023-01-24 ENCOUNTER — OFFICE VISIT (OUTPATIENT)
Dept: FAMILY MEDICINE CLINIC | Facility: CLINIC | Age: 33
End: 2023-01-24

## 2023-01-24 VITALS
TEMPERATURE: 97 F | BODY MASS INDEX: 21.19 KG/M2 | SYSTOLIC BLOOD PRESSURE: 115 MMHG | WEIGHT: 174 LBS | RESPIRATION RATE: 18 BRPM | HEART RATE: 90 BPM | DIASTOLIC BLOOD PRESSURE: 74 MMHG | HEIGHT: 76 IN

## 2023-01-24 DIAGNOSIS — M25.511 ACUTE PAIN OF RIGHT SHOULDER: Primary | ICD-10-CM

## 2023-01-24 DIAGNOSIS — S46.811A STRAIN OF RIGHT TRAPEZIUS MUSCLE, INITIAL ENCOUNTER: ICD-10-CM

## 2023-01-24 PROCEDURE — 3078F DIAST BP <80 MM HG: CPT | Performed by: FAMILY MEDICINE

## 2023-01-24 PROCEDURE — 3008F BODY MASS INDEX DOCD: CPT | Performed by: FAMILY MEDICINE

## 2023-01-24 PROCEDURE — 99213 OFFICE O/P EST LOW 20 MIN: CPT | Performed by: FAMILY MEDICINE

## 2023-01-24 PROCEDURE — 3074F SYST BP LT 130 MM HG: CPT | Performed by: FAMILY MEDICINE

## 2023-01-25 ENCOUNTER — HOSPITAL ENCOUNTER (OUTPATIENT)
Dept: GENERAL RADIOLOGY | Age: 33
Discharge: HOME | End: 2023-01-25
Attending: FAMILY MEDICINE
Payer: MEDICAID

## 2023-01-25 DIAGNOSIS — M25.511 ACUTE PAIN OF RIGHT SHOULDER: ICD-10-CM

## 2023-01-25 DIAGNOSIS — S46.811A STRAIN OF RIGHT TRAPEZIUS MUSCLE, INITIAL ENCOUNTER: ICD-10-CM

## 2023-01-25 PROCEDURE — 73030 X-RAY EXAM OF SHOULDER: CPT | Performed by: FAMILY MEDICINE

## 2023-01-25 PROCEDURE — 72050 X-RAY EXAM NECK SPINE 4/5VWS: CPT | Performed by: FAMILY MEDICINE

## 2023-02-09 RX ORDER — ALPRAZOLAM 1 MG/1
1 TABLET ORAL 2 TIMES DAILY PRN
Qty: 60 TABLET | Refills: 0 | Status: SHIPPED | OUTPATIENT
Start: 2023-02-09

## 2023-02-09 RX ORDER — TRAMADOL HYDROCHLORIDE 50 MG/1
50 TABLET ORAL EVERY 6 HOURS PRN
Qty: 120 TABLET | Refills: 0 | Status: SHIPPED | OUTPATIENT
Start: 2023-02-09

## 2023-02-09 NOTE — TELEPHONE ENCOUNTER
Protocol failed or has No Protocol, please review    Last office visit 1/24/2023    Requested Prescriptions   Pending Prescriptions Disp Refills    traMADol 50 MG Oral Tab 120 tablet 0     Sig: Take 1 tablet (50 mg total) by mouth every 6 (six) hours as needed for Pain. There is no refill protocol information for this order       ALPRAZolam 1 MG Oral Tab 60 tablet 0     Sig: Take 1 tablet (1 mg total) by mouth 2 (two) times daily as needed for Anxiety.        There is no refill protocol information for this order          Recent Outpatient Visits              2 weeks ago Acute pain of right shoulder    You Schultz MD    Office Visit    8 months ago Other fatigue    Mitra Cerrato APRN    Office Visit    10 months ago Acute bilateral low back pain with sciatica, sciatica laterality unspecified    [facility], [address] [facility] KANDICE [place]    Office Visit    1 year ago Chronic back pain, unspecified back location, unspecified back pain laterality    You Schultz MD    Office Visit    2 years ago Chronic back pain, unspecified back location, unspecified back pain laterality    Anatoliy Cerrato MD    Office Visit

## 2023-02-09 NOTE — TELEPHONE ENCOUNTER
Message noted: Chart reviewed and may refill medications as requested. Script sent to listed pharmacy by secure method.     Pt notified through [facility]

## 2023-03-06 RX ORDER — TRAMADOL HYDROCHLORIDE 50 MG/1
50 TABLET ORAL EVERY 6 HOURS PRN
Qty: 120 TABLET | Refills: 0 | Status: SHIPPED | OUTPATIENT
Start: 2023-03-06

## 2023-03-06 RX ORDER — ALPRAZOLAM 1 MG/1
1 TABLET ORAL 2 TIMES DAILY PRN
Qty: 60 TABLET | Refills: 0 | Status: SHIPPED | OUTPATIENT
Start: 2023-03-06

## 2023-03-06 NOTE — TELEPHONE ENCOUNTER
Please review; no protocol  Medications pended for your review and approval.     Requested Prescriptions   Pending Prescriptions Disp Refills    traMADol 50 MG Oral Tab 120 tablet 0     Sig: Take 1 tablet (50 mg total) by mouth every 6 (six) hours as needed for Pain. There is no refill protocol information for this order       ALPRAZolam 1 MG Oral Tab 60 tablet 0     Sig: Take 1 tablet (1 mg total) by mouth 2 (two) times daily as needed for Anxiety.        There is no refill protocol information for this order

## 2023-04-03 RX ORDER — TRAMADOL HYDROCHLORIDE 50 MG/1
50 TABLET ORAL EVERY 6 HOURS PRN
Qty: 120 TABLET | Refills: 0 | Status: SHIPPED | OUTPATIENT
Start: 2023-04-03

## 2023-04-03 RX ORDER — ALPRAZOLAM 1 MG/1
1 TABLET ORAL 2 TIMES DAILY PRN
Qty: 60 TABLET | Refills: 0 | Status: SHIPPED | OUTPATIENT
Start: 2023-04-03

## 2023-04-03 NOTE — TELEPHONE ENCOUNTER
Message noted: Chart reviewed and may refill medication as requested. Script sent to listed pharmacy by secure method.     Pt notified through [facility]

## 2023-05-02 RX ORDER — TRAMADOL HYDROCHLORIDE 50 MG/1
50 TABLET ORAL EVERY 6 HOURS PRN
Qty: 120 TABLET | Refills: 0 | Status: SHIPPED | OUTPATIENT
Start: 2023-05-02

## 2023-05-02 RX ORDER — ALPRAZOLAM 1 MG/1
1 TABLET ORAL 2 TIMES DAILY PRN
Qty: 60 TABLET | Refills: 0 | Status: SHIPPED | OUTPATIENT
Start: 2023-05-02

## 2023-05-02 NOTE — TELEPHONE ENCOUNTER
Please review; protocol failed. Or has no protocol    Requested Prescriptions   Pending Prescriptions Disp Refills    traMADol 50 MG Oral Tab 120 tablet 0     Sig: Take 1 tablet (50 mg total) by mouth every 6 (six) hours as needed for Pain. There is no refill protocol information for this order       ALPRAZolam 1 MG Oral Tab 60 tablet 0     Sig: Take 1 tablet (1 mg total) by mouth 2 (two) times daily as needed for Anxiety.        There is no refill protocol information for this order           Recent Outpatient Visits              3 months ago Acute pain of right shoulder    Clare Garcia MD    Office Visit    11 months ago Other fatigue    Mitra Alvares APRN    Office Visit    1 year ago Acute bilateral low back pain with sciatica, sciatica laterality unspecified    [facility], [address] [facility], KANDICE Fox    Office Visit    1 year ago Chronic back pain, unspecified back location, unspecified back pain laterality    Clare Garcia MD    Office Visit    2 years ago Chronic back pain, unspecified back location, unspecified back pain laterality    Chaka Jimenez, Thuy Granados MD    Office Visit

## 2023-05-02 NOTE — TELEPHONE ENCOUNTER
Refill passed per [facility] protocol. Requested Prescriptions   Pending Prescriptions Disp Refills    traMADol 50 MG Oral Tab 120 tablet 0     Sig: Take 1 tablet (50 mg total) by mouth every 6 (six) hours as needed for Pain. There is no refill protocol information for this order       ALPRAZolam 1 MG Oral Tab 60 tablet 0     Sig: Take 1 tablet (1 mg total) by mouth 2 (two) times daily as needed for Anxiety.        There is no refill protocol information for this order           Recent Outpatient Visits              3 months ago Acute pain of right shoulder    Kofi Bender MD    Office Visit    11 months ago [facility] fatigue    [facility]    Office Visit    1 year ago Acute bilateral low back pain with sciatica, sciatica laterality unspecified    [facility], [address] [place] [address] KANDICE Turner    Office Visit    1 year ago Chronic back pain, unspecified back location, unspecified back pain laterality    Kofi Bender MD    Office Visit    2 years ago Chronic back pain, unspecified back location, unspecified back pain laterality    [address] [facility], Iker Lovell MD    Office Visit

## 2023-05-30 RX ORDER — ALPRAZOLAM 1 MG/1
1 TABLET ORAL 2 TIMES DAILY PRN
Qty: 60 TABLET | Refills: 0 | Status: SHIPPED | OUTPATIENT
Start: 2023-05-30

## 2023-05-30 RX ORDER — TRAMADOL HYDROCHLORIDE 50 MG/1
50 TABLET ORAL EVERY 6 HOURS PRN
Qty: 120 TABLET | Refills: 5 | Status: SHIPPED | OUTPATIENT
Start: 2023-05-30

## 2023-05-30 NOTE — TELEPHONE ENCOUNTER
Please review refill failed/no protocol     Requested Prescriptions     Pending Prescriptions Disp Refills    traMADol 50 MG Oral Tab 120 tablet 0     Sig: Take 1 tablet (50 mg total) by mouth every 6 (six) hours as needed for Pain. ALPRAZolam 1 MG Oral Tab 60 tablet 0     Sig: Take 1 tablet (1 mg total) by mouth 2 (two) times daily as needed for Anxiety. Recent Visits  Date Type Provider Dept   01/24/23 Office Visit Juan F Romero MD [facility]   06/06/22 Office Visit KANDICE Gates [facility]   04/11/22 Office Visit KANDICE Gates [facility]   Showing recent visits within past 540 days with a meds authorizing provider and meeting all other requirements  Future Appointments  No visits were found meeting these conditions. Showing future appointments within next 150 days with a meds authorizing provider and meeting all other requirements    Requested Prescriptions   Pending Prescriptions Disp Refills    traMADol 50 MG Oral Tab 120 tablet 0     Sig: Take 1 tablet (50 mg total) by mouth every 6 (six) hours as needed for Pain. There is no refill protocol information for this order       ALPRAZolam 1 MG Oral Tab 60 tablet 0     Sig: Take 1 tablet (1 mg total) by mouth 2 (two) times daily as needed for Anxiety.        There is no refill protocol information for this order

## 2023-06-29 RX ORDER — ALPRAZOLAM 1 MG/1
1 TABLET ORAL 2 TIMES DAILY PRN
Qty: 60 TABLET | Refills: 0 | Status: SHIPPED | OUTPATIENT
Start: 2023-06-29

## 2023-06-29 RX ORDER — TRAMADOL HYDROCHLORIDE 50 MG/1
50 TABLET ORAL EVERY 6 HOURS PRN
Qty: 120 TABLET | Refills: 5 | Status: SHIPPED | OUTPATIENT
Start: 2023-06-29

## 2023-07-24 RX ORDER — TRAMADOL HYDROCHLORIDE 50 MG/1
50 TABLET ORAL EVERY 6 HOURS PRN
Qty: 120 TABLET | Refills: 5 | Status: CANCELLED | OUTPATIENT
Start: 2023-07-24

## 2023-07-25 RX ORDER — ALPRAZOLAM 1 MG/1
1 TABLET ORAL 2 TIMES DAILY PRN
Qty: 60 TABLET | Refills: 0 | Status: SHIPPED | OUTPATIENT
Start: 2023-07-25

## 2023-08-24 RX ORDER — ALPRAZOLAM 1 MG/1
1 TABLET ORAL 2 TIMES DAILY PRN
Qty: 60 TABLET | Refills: 0 | Status: SHIPPED | OUTPATIENT
Start: 2023-08-24

## 2023-08-24 RX ORDER — TRAMADOL HYDROCHLORIDE 50 MG/1
50 TABLET ORAL EVERY 6 HOURS PRN
Qty: 120 TABLET | Refills: 5 | Status: SHIPPED | OUTPATIENT
Start: 2023-08-24

## 2023-08-24 NOTE — TELEPHONE ENCOUNTER
Please review. Protocol failed / Has no protocol. Requested Prescriptions   Pending Prescriptions Disp Refills    traMADol 50 MG Oral Tab 120 tablet 5     Sig: Take 1 tablet (50 mg total) by mouth every 6 (six) hours as needed for Pain. There is no refill protocol information for this order       ALPRAZolam 1 MG Oral Tab 60 tablet 0     Sig: Take 1 tablet (1 mg total) by mouth 2 (two) times daily as needed for Anxiety.        There is no refill protocol information for this order           Recent Outpatient Visits              7 months ago Acute pain of right shoulder    Clare Garcia MD    Office Visit    1 year ago Other fatigue    Mitra Alvares APRN    Office Visit    1 year ago Acute bilateral low back pain with sciatica, sciatica laterality unspecified    [facility], [address] [facility] KANDICE Fox    Office Visit    1 year ago Chronic back pain, unspecified back location, unspecified back pain laterality    Thuy Alvares MD    Office Visit    3 years ago Chronic back pain, unspecified back location, unspecified back pain laterality    Thuy Alvares MD    Office Visit

## 2023-09-20 RX ORDER — ALPRAZOLAM 1 MG/1
1 TABLET ORAL 2 TIMES DAILY PRN
Qty: 60 TABLET | Refills: 0 | Status: SHIPPED | OUTPATIENT
Start: 2023-09-20

## 2023-09-20 RX ORDER — TRAMADOL HYDROCHLORIDE 50 MG/1
50 TABLET ORAL EVERY 6 HOURS PRN
Qty: 120 TABLET | Refills: 5 | Status: SHIPPED | OUTPATIENT
Start: 2023-09-20

## 2023-09-20 NOTE — TELEPHONE ENCOUNTER
Please review; protocol failed. Requested Prescriptions   Pending Prescriptions Disp Refills    traMADol 50 MG Oral Tab 120 tablet 5     Sig: Take 1 tablet (50 mg total) by mouth every 6 (six) hours as needed for Pain. There is no refill protocol information for this order       ALPRAZolam 1 MG Oral Tab 60 tablet 0     Sig: Take 1 tablet (1 mg total) by mouth 2 (two) times daily as needed for Anxiety.        There is no refill protocol information for this order        Recent Outpatient Visits              7 months ago Acute pain of right shoulder    Karen Mueller MD    Office Visit    1 year ago Other fatigue    [address] [facility]    Office Visit    1 year ago Acute bilateral low back pain with sciatica, sciatica laterality unspecified    [facility], [address] [place] [address], [facility]    Office Visit    1 year ago Chronic back pain, unspecified back location, unspecified back pain laterality    [facility]    Office Visit    3 years ago Chronic back pain, unspecified back location, unspecified back pain laterality    [facility]    [facility]

## 2023-10-11 RX ORDER — TRAMADOL HYDROCHLORIDE 50 MG/1
50 TABLET ORAL EVERY 6 HOURS PRN
Qty: 120 TABLET | Refills: 5 | Status: CANCELLED | OUTPATIENT
Start: 2023-10-11

## 2023-10-12 RX ORDER — ALPRAZOLAM 1 MG/1
1 TABLET ORAL 2 TIMES DAILY PRN
Qty: 60 TABLET | Refills: 0 | Status: SHIPPED | OUTPATIENT
Start: 2023-10-12

## 2023-10-12 NOTE — TELEPHONE ENCOUNTER
Pharmacy    [id number] - [address] [facility], [phone]      Disp Refills Start End    traMADol 50 MG Oral Tab 120 tablet 5 9/20/2023     Sig - Route:  Take 1 tablet (50 mg total) by mouth every 6 (six) hours as needed for Pain. - Oral    Sent to pharmacy as: traMADol HCl 50 MG Oral Tablet (Ultram)    E-Prescribing Status: Receipt confirmed by pharmacy (9/20/2023  4:39 PM CDT)

## 2023-10-12 NOTE — TELEPHONE ENCOUNTER
Please review. Protocol failed or has no protocol. Requested Prescriptions   Pending Prescriptions Disp Refills    ALPRAZolam 1 MG Oral Tab 60 tablet 0     Sig: Take 1 tablet (1 mg total) by mouth 2 (two) times daily as needed for Anxiety.        There is no refill protocol information for this order          Recent Outpatient Visits              8 months ago Acute pain of right shoulder    Tianna Forbes MD    Office Visit    1 year ago Other fatigue    [address] [facility] KANDICE [place]    Office Visit    1 year ago Acute bilateral low back pain with sciatica, sciatica laterality unspecified    [facility], [address] [place] [address] KANDICE [place]    Office Visit    2 years ago Chronic back pain, unspecified back location, unspecified back pain laterality    Tianna Forbes MD    Office Visit    3 years ago Chronic back pain, unspecified back location, unspecified back pain laterality    [address] [facility], Freida Navarro MD    Office Visit

## 2023-10-16 RX ORDER — ALPRAZOLAM 1 MG/1
1 TABLET ORAL 2 TIMES DAILY PRN
Qty: 60 TABLET | Refills: 0 | Status: CANCELLED | OUTPATIENT
Start: 2023-10-16

## 2023-10-17 RX ORDER — TRAMADOL HYDROCHLORIDE 50 MG/1
50 TABLET ORAL EVERY 6 HOURS PRN
Qty: 120 TABLET | Refills: 2 | Status: SHIPPED | OUTPATIENT
Start: 2023-10-17

## 2023-10-17 NOTE — TELEPHONE ENCOUNTER
Please review. Protocol failed / No protocol. Requested Prescriptions   Pending Prescriptions Disp Refills    traMADol 50 MG Oral Tab 120 tablet 5     Sig: Take 1 tablet (50 mg total) by mouth every 6 (six) hours as needed for Pain.        There is no refill protocol information for this order              Recent Outpatient Visits              8 months ago Acute pain of right shoulder    Jaleesa Abbott MD    Office Visit    1 year ago Other fatigue    [address] [facility] [place] KANDICE Maxwell    Office Visit    1 year ago Acute bilateral low back pain with sciatica, sciatica laterality unspecified    [facility], [address] [place] [address] KANDICE Maxwell    Office Visit    2 years ago Chronic back pain, unspecified back location, unspecified back pain laterality    Jaleesa Abbott MD    Office Visit    3 years ago Chronic back pain, unspecified back location, unspecified back pain laterality    [address] [facility], Eyad Blanton MD    Office Visit

## 2023-11-16 RX ORDER — TRAMADOL HYDROCHLORIDE 50 MG/1
50 TABLET ORAL EVERY 6 HOURS PRN
Qty: 120 TABLET | Refills: 2 | Status: SHIPPED | OUTPATIENT
Start: 2023-11-16

## 2023-11-16 RX ORDER — ALPRAZOLAM 1 MG/1
1 TABLET ORAL 2 TIMES DAILY PRN
Qty: 60 TABLET | Refills: 0 | Status: SHIPPED | OUTPATIENT
Start: 2023-11-16

## 2023-11-16 NOTE — TELEPHONE ENCOUNTER
Please review; protocol failed. Requested Prescriptions   Pending Prescriptions Disp Refills    ALPRAZolam 1 MG Oral Tab 60 tablet 0     Sig: Take 1 tablet (1 mg total) by mouth 2 (two) times daily as needed for Anxiety. There is no refill protocol information for this order       traMADol 50 MG Oral Tab 120 tablet 2     Sig: Take 1 tablet (50 mg total) by mouth every 6 (six) hours as needed for Pain.        There is no refill protocol information for this order        Recent Outpatient Visits              9 months ago Acute pain of right shoulder    Jenna Askew MD    Office Visit    1 year ago Other fatigue    [facility]    Office Visit    1 year ago Acute bilateral low back pain with sciatica, sciatica laterality unspecified    [facility], [address] [facility] [place]    Office Visit    2 years ago Chronic back pain, unspecified back location, unspecified back pain laterality    Jenna Askew MD    Office Visit    3 years ago Chronic back pain, unspecified back location, unspecified back pain laterality    [address] [facility], Elvira [place], MD    Office Visit

## 2023-12-15 RX ORDER — ALPRAZOLAM 1 MG/1
1 TABLET ORAL 2 TIMES DAILY PRN
Qty: 60 TABLET | Refills: 0 | Status: SHIPPED | OUTPATIENT
Start: 2023-12-15

## 2023-12-15 RX ORDER — TRAMADOL HYDROCHLORIDE 50 MG/1
50 TABLET ORAL EVERY 6 HOURS PRN
Qty: 120 TABLET | Refills: 2 | Status: SHIPPED | OUTPATIENT
Start: 2023-12-15

## 2023-12-15 NOTE — TELEPHONE ENCOUNTER
Please review; protocol failed. Last office visit 1/24/23    Requested Prescriptions   Pending Prescriptions Disp Refills    ALPRAZolam 1 MG Oral Tab 60 tablet 0     Sig: Take 1 tablet (1 mg total) by mouth 2 (two) times daily as needed for Anxiety. There is no refill protocol information for this order       traMADol 50 MG Oral Tab 120 tablet 2     Sig: Take 1 tablet (50 mg total) by mouth every 6 (six) hours as needed for Pain.        There is no refill protocol information for this order          Recent Outpatient Visits              10 months ago Acute pain of right shoulder    Niyah Scott MD    Office Visit    1 year ago Other fatigue    [address] [facility], [facility]    Office Visit    1 year ago Acute bilateral low back pain with sciatica, sciatica laterality unspecified    [facility], [address] [place] [address], [facility] K[facility]    Office Visit    2 years ago Chronic back pain, unspecified back location, unspecified back pain laterality    Niyah Scott MD    Office Visit    3 years ago Chronic back pain, unspecified back location, unspecified back pain laterality    [address] [facility], Thais uJlian MD    Office Visit

## 2024-01-16 RX ORDER — ALPRAZOLAM 1 MG/1
1 TABLET ORAL 2 TIMES DAILY PRN
Qty: 60 TABLET | Refills: 0 | OUTPATIENT
Start: 2024-01-16

## 2024-01-16 RX ORDER — TRAMADOL HYDROCHLORIDE 50 MG/1
50 TABLET ORAL EVERY 6 HOURS PRN
Qty: 120 TABLET | Refills: 2 | OUTPATIENT
Start: 2024-01-16

## 2024-02-12 DIAGNOSIS — F41.9 ANXIETY: ICD-10-CM

## 2024-02-14 RX ORDER — ALPRAZOLAM 1 MG/1
1 TABLET ORAL 2 TIMES DAILY PRN
Qty: 60 TABLET | Refills: 0 | Status: SHIPPED | OUTPATIENT
Start: 2024-02-14

## 2024-02-14 NOTE — TELEPHONE ENCOUNTER
Please review; protocol failed/No Protocol    LOV: 01/24/2023  Sent Jarrod to patient to schedule an appointment    Fill dates:11/16/2023, 12/15/2023, 01/16/2024  Requested Prescriptions   Pending Prescriptions Disp Refills    ALPRAZOLAM 1 MG Oral Tab [Pharmacy Med Name: Alprazolam 1 Mg Tab Acta] 60 tablet 0     Sig: Take 1 tablet by mouth 2 (two) times daily as needed for Anxiety.       Controlled Substance Medication Failed - 2/12/2024 10:55 AM        Failed - This medication is a controlled substance - forward to provider to refill             Recent Outpatient Visits              1 year ago Acute pain of right shoulder    [facility]    Office Visit    1 year ago Other fatigue    [facility]    Office Visit    1 year ago Acute bilateral low back pain with sciatica, sciatica laterality unspecified    [facility]    Office Visit    2 years ago Chronic back pain, unspecified back location, unspecified back pain laterality    [facility]    Office Visit    3 years ago Chronic back pain, unspecified back location, unspecified back pain laterality    [facility]    Office Visit

## 2024-02-14 NOTE — TELEPHONE ENCOUNTER
Message noted: Chart reviewed and may refill medication as requested. Script sent to listed pharmacy by secure method.    Pt notified through [employer]

## 2024-03-10 ENCOUNTER — TELEPHONE (OUTPATIENT)
Dept: FAMILY MEDICINE CLINIC | Facility: CLINIC | Age: 34
End: 2024-03-10

## 2024-03-10 DIAGNOSIS — M54.40 ACUTE BILATERAL LOW BACK PAIN WITH SCIATICA, SCIATICA LATERALITY UNSPECIFIED: Primary | ICD-10-CM

## 2024-03-10 DIAGNOSIS — F41.9 ANXIETY: ICD-10-CM

## 2024-03-11 RX ORDER — TRAMADOL HYDROCHLORIDE 50 MG/1
50 TABLET ORAL EVERY 6 HOURS PRN
Qty: 120 TABLET | Refills: 0 | Status: SHIPPED | OUTPATIENT
Start: 2024-03-11

## 2024-03-11 RX ORDER — ALPRAZOLAM 1 MG/1
1 TABLET ORAL 2 TIMES DAILY PRN
Qty: 60 TABLET | Refills: 0 | Status: SHIPPED | OUTPATIENT
Start: 2024-03-11

## 2024-03-11 NOTE — TELEPHONE ENCOUNTER
Message noted: Chart reviewed and may refill medication as requested. Script sent to listed pharmacy by secure method.    Pt notified through [employer]

## 2024-03-11 NOTE — TELEPHONE ENCOUNTER
Please review; protocol failed/ has no protocol    Message sent for patient to make an appointment.   Not seen since 01/24/2023  Requested Prescriptions   Pending Prescriptions Disp Refills    traMADol 50 MG Oral Tab 120 tablet 2     Sig: Take 1 tablet (50 mg total) by mouth every 6 (six) hours as needed for Pain.       Controlled Substance Medication Failed - 3/10/2024  9:52 AM        Failed - This medication is a controlled substance - forward to provider to refill          ALPRAZolam 1 MG Oral Tab 60 tablet 0     Sig: Take 1 tablet (1 mg total) by mouth 2 (two) times daily as needed for Anxiety.       Controlled Substance Medication Failed - 3/10/2024  9:52 AM        Failed - This medication is a controlled substance - forward to provider to refill           Recent Outpatient Visits              1 year ago Acute pain of right shoulder    [facility]    Office Visit    1 year ago Other fatigue    [facility] Mitra Herrera Jennifer, APRN    Office Visit    1 year ago Acute bilateral low back pain with sciatica, sciatica laterality unspecified    [facility]    Office Visit    2 years ago Chronic back pain, unspecified back location, unspecified back pain laterality    [facility]    Office Visit    3 years ago Chronic back pain, unspecified back location, unspecified back pain laterality    [facility]    Office Visit

## 2024-04-07 DIAGNOSIS — M54.40 ACUTE BILATERAL LOW BACK PAIN WITH SCIATICA, SCIATICA LATERALITY UNSPECIFIED: ICD-10-CM

## 2024-04-07 DIAGNOSIS — F41.9 ANXIETY: ICD-10-CM

## 2024-04-09 RX ORDER — TRAMADOL HYDROCHLORIDE 50 MG/1
50 TABLET ORAL EVERY 6 HOURS PRN
Qty: 120 TABLET | Refills: 0 | Status: SHIPPED | OUTPATIENT
Start: 2024-04-09

## 2024-04-09 RX ORDER — ALPRAZOLAM 1 MG/1
1 TABLET ORAL 2 TIMES DAILY PRN
Qty: 60 TABLET | Refills: 0 | Status: SHIPPED | OUTPATIENT
Start: 2024-04-09

## 2024-04-09 NOTE — TELEPHONE ENCOUNTER
Message noted: Chart reviewed and may refill medication as requested. Script sent to listed pharmacy by secure method.    Pt notified through [employer]

## 2024-04-09 NOTE — TELEPHONE ENCOUNTER
Please review; protocol failed/no protocol.     Requested Prescriptions   Pending Prescriptions Disp Refills    traMADol 50 MG Oral Tab 120 tablet 0     Sig: Take 1 tablet (50 mg total) by mouth every 6 (six) hours as needed for Pain.       Controlled Substance Medication Failed - 4/7/2024  8:41 AM        Failed - This medication is a controlled substance - forward to provider to refill          ALPRAZolam 1 MG Oral Tab 60 tablet 0     Sig: Take 1 tablet (1 mg total) by mouth 2 (two) times daily as needed for Anxiety.       Controlled Substance Medication Failed - 4/7/2024  8:41 AM        Failed - This medication is a controlled substance - forward to provider to refill              Recent Outpatient Visits              2 weeks ago Anxiety    [facility]    Office Visit    1 year ago Acute pain of right shoulder    [facility]    Office Visit    1 year ago Other fatigue    [facility] Jennifer, APRN    Office Visit    1 year ago Acute bilateral low back pain with sciatica, sciatica laterality unspecified    [facility] Her[facility] Jennifer, APRN    Office Visit    2 years ago Chronic back pain, unspecified back location, unspecified back pain laterality    [facility]    Office Visit

## 2024-05-01 NOTE — TELEPHONE ENCOUNTER
LVM to inform Pt that Rx was sent to the pharmacy. Detail Level: Detailed General Sunscreen Counseling: I recommended a broad spectrum sunscreen with a SPF of 30 or higher.  I explained that SPF 30 sunscreens block approximately 97 percent of the sun's harmful rays.  Sunscreens should be applied at least 15 minutes prior to expected sun exposure and then every 2 hours after that as long as sun exposure continues. If swimming or exercising sunscreen should be reapplied every 45 minutes to an hour after getting wet or sweating.  One ounce, or the equivalent of a shot glass full of sunscreen, is adequate to protect the skin not covered by a bathing suit. I also recommended a lip balm with a sunscreen as well. Sun protective clothing can be used in lieu of sunscreen but must be worn the entire time you are exposed to the sun's rays.

## 2024-05-03 DIAGNOSIS — F41.9 ANXIETY: ICD-10-CM

## 2024-05-03 DIAGNOSIS — M54.40 ACUTE BILATERAL LOW BACK PAIN WITH SCIATICA, SCIATICA LATERALITY UNSPECIFIED: ICD-10-CM

## 2024-05-05 NOTE — TELEPHONE ENCOUNTER
Please review; protocol failed. Or has no protocol    Requested Prescriptions   Pending Prescriptions Disp Refills    traMADol 50 MG Oral Tab 120 tablet 0     Sig: Take 1 tablet (50 mg total) by mouth every 6 (six) hours as needed for Pain.       Controlled Substance Medication Failed - 5/3/2024  3:58 PM        Failed - This medication is a controlled substance - forward to provider to refill          ALPRAZolam 1 MG Oral Tab 60 tablet 0     Sig: Take 1 tablet (1 mg total) by mouth 2 (two) times daily as needed for Anxiety.       Controlled Substance Medication Failed - 5/3/2024  3:58 PM        Failed - This medication is a controlled substance - forward to provider to refill              Recent Outpatient Visits              1 month ago Anxiety    [facility] Mitra Herrera Nathaniel, MD    Office Visit    1 year ago Acute pain of right shoulder    [facility]    Office Visit    1 year ago Other fatigue    [facility] Mitra Herrera Jennifer, APRN    Office Visit    2 years ago Acute bilateral low back pain with sciatica, sciatica laterality unspecified    [facility] Mitra Herrera Jennifer, APRN    Office Visit    2 years ago Chronic back pain, unspecified back location, unspecified back pain laterality    [facility]    Office Visit

## 2024-05-06 RX ORDER — TRAMADOL HYDROCHLORIDE 50 MG/1
50 TABLET ORAL EVERY 6 HOURS PRN
Qty: 120 TABLET | Refills: 0 | Status: SHIPPED | OUTPATIENT
Start: 2024-05-06

## 2024-05-06 RX ORDER — ALPRAZOLAM 1 MG/1
1 TABLET ORAL 2 TIMES DAILY PRN
Qty: 60 TABLET | Refills: 0 | Status: SHIPPED | OUTPATIENT
Start: 2024-05-06

## 2024-05-06 NOTE — TELEPHONE ENCOUNTER
Message noted: Chart reviewed and may refill medication as requested. Script sent to listed pharmacy by secure method.    Pt notified through [facility]

## 2024-06-03 DIAGNOSIS — M54.40 ACUTE BILATERAL LOW BACK PAIN WITH SCIATICA, SCIATICA LATERALITY UNSPECIFIED: ICD-10-CM

## 2024-06-03 DIAGNOSIS — F41.9 ANXIETY: ICD-10-CM

## 2024-06-05 DIAGNOSIS — M54.40 ACUTE BILATERAL LOW BACK PAIN WITH SCIATICA, SCIATICA LATERALITY UNSPECIFIED: ICD-10-CM

## 2024-06-05 DIAGNOSIS — F41.9 ANXIETY: ICD-10-CM

## 2024-06-06 RX ORDER — ALPRAZOLAM 1 MG/1
1 TABLET ORAL 2 TIMES DAILY PRN
Qty: 60 TABLET | Refills: 0 | OUTPATIENT
Start: 2024-06-06

## 2024-06-06 RX ORDER — ALPRAZOLAM 1 MG/1
1 TABLET ORAL 2 TIMES DAILY PRN
Qty: 60 TABLET | Refills: 0 | Status: SHIPPED | OUTPATIENT
Start: 2024-06-07

## 2024-06-06 RX ORDER — TRAMADOL HYDROCHLORIDE 50 MG/1
50 TABLET ORAL EVERY 6 HOURS PRN
Qty: 120 TABLET | Refills: 0 | Status: SHIPPED | OUTPATIENT
Start: 2024-06-07

## 2024-06-06 RX ORDER — TRAMADOL HYDROCHLORIDE 50 MG/1
50 TABLET ORAL EVERY 6 HOURS PRN
Qty: 120 TABLET | Refills: 0 | OUTPATIENT
Start: 2024-06-06

## 2024-06-06 NOTE — TELEPHONE ENCOUNTER
Message noted: Chart reviewed and may refill medication as requested. Script sent to listed pharmacy by secure method.    Pt notified through [employer]

## 2024-06-06 NOTE — TELEPHONE ENCOUNTER
Please Review. Protocol Failed; No Protocol     ALPRAZolam 1 MG Oral Tab  Recent fills: Quantity: 60  05/07/2024 04/09/2024 02/12/2024                                                                      Patient is due: 06/07/2024  Last Rx written: 05/06/2024  Last Office Visit: 03/25/2024    traMADol 50 MG Oral Tab    Recent fills: Quantity: 120                                                                      05/07/2024 04/09/2024 02/12/2024  Patient is due: 06/07/2024  Last Rx written: 05/06/2024  Last Office Visit: 03/25/2024        Requested Prescriptions   Pending Prescriptions Disp Refills    traMADol 50 MG Oral Tab 120 tablet 0     Sig: Take 1 tablet (50 mg total) by mouth every 6 (six) hours as needed for Pain.       Controlled Substance Medication Failed - 6/3/2024  9:38 AM        Failed - This medication is a controlled substance - forward to provider to refill          ALPRAZolam 1 MG Oral Tab 60 tablet 0     Sig: Take 1 tablet (1 mg total) by mouth 2 (two) times daily as needed for Anxiety.       Controlled Substance Medication Failed - 6/3/2024  9:38 AM        Failed - This medication is a controlled substance - forward to provider to refill                 Recent Outpatient Visits              2 months ago Anxiety    [facility]    Office Visit    1 year ago Acute pain of right shoulder    [facility]    [facility] Visit    2 years ago Other fatigue    [facility]    Office Visit    2 years ago Acute bilateral low back pain with sciatica, sciatica laterality unspecified    [facility]    Office Visit    2 years ago Chronic back pain, unspecified back location, unspecified back pain laterality    [facility]  Mitra Herrera Nathaniel, MD    Office Visit

## 2024-07-02 DIAGNOSIS — M54.40 ACUTE BILATERAL LOW BACK PAIN WITH SCIATICA, SCIATICA LATERALITY UNSPECIFIED: ICD-10-CM

## 2024-07-02 DIAGNOSIS — F41.9 ANXIETY: ICD-10-CM

## 2024-07-05 RX ORDER — TRAMADOL HYDROCHLORIDE 50 MG/1
50 TABLET ORAL EVERY 6 HOURS PRN
Qty: 120 TABLET | Refills: 0 | Status: SHIPPED | OUTPATIENT
Start: 2024-07-05

## 2024-07-05 RX ORDER — ALPRAZOLAM 1 MG/1
1 TABLET ORAL 2 TIMES DAILY PRN
Qty: 60 TABLET | Refills: 0 | Status: SHIPPED | OUTPATIENT
Start: 2024-07-05

## 2024-07-05 NOTE — TELEPHONE ENCOUNTER
Message noted: Chart reviewed and may refill medication as requested. Script sent to listed pharmacy by secure method.    Pt notified through [employer] [place]

## 2024-07-05 NOTE — TELEPHONE ENCOUNTER
Please review. Protocol Failed; No Protocol      Recent fills: 4/9/2024, 5/7/2024, 6/7/2024  Last Rx written: 6/6/2024  Last office visit: 3/25/2024        Requested Prescriptions   Pending Prescriptions Disp Refills    traMADol 50 MG Oral Tab 120 tablet 0     Sig: Take 1 tablet (50 mg total) by mouth every 6 (six) hours as needed for Pain.       Controlled Substance Medication Failed - 7/2/2024  4:31 PM        Failed - This medication is a controlled substance - forward to provider to refill          ALPRAZolam 1 MG Oral Tab 60 tablet 0     Sig: Take 1 tablet (1 mg total) by mouth 2 (two) times daily as needed for Anxiety.       Controlled Substance Medication Failed - 7/2/2024  4:31 PM        Failed - This medication is a controlled substance - forward to provider to refill                 Recent Outpatient Visits              3 months ago Anxiety    [facility]    Office Visit    1 year ago Acute pain of right shoulder    [facility]    Office Visit    2 years ago Other fatigue    [facility] Mitra Herrera Jennifer, APRN    Office Visit    2 years ago Acute bilateral low back pain with sciatica, sciatica laterality unspecified    [facility]    Office Visit    2 years ago Chronic back pain, unspecified back location, unspecified back pain laterality    [facility] Mitra Herrera Nathaniel, MD    Office Visit

## 2024-08-02 DIAGNOSIS — F41.9 ANXIETY: ICD-10-CM

## 2024-08-02 DIAGNOSIS — M54.40 ACUTE BILATERAL LOW BACK PAIN WITH SCIATICA, SCIATICA LATERALITY UNSPECIFIED: ICD-10-CM

## 2024-08-06 DIAGNOSIS — M54.40 ACUTE BILATERAL LOW BACK PAIN WITH SCIATICA, SCIATICA LATERALITY UNSPECIFIED: ICD-10-CM

## 2024-08-06 DIAGNOSIS — F41.9 ANXIETY: ICD-10-CM

## 2024-08-06 RX ORDER — ALPRAZOLAM 1 MG/1
1 TABLET ORAL 2 TIMES DAILY PRN
Qty: 60 TABLET | Refills: 0 | OUTPATIENT
Start: 2024-08-06

## 2024-08-06 RX ORDER — TRAMADOL HYDROCHLORIDE 50 MG/1
50 TABLET ORAL EVERY 6 HOURS PRN
Qty: 120 TABLET | Refills: 0 | Status: SHIPPED | OUTPATIENT
Start: 2024-08-06

## 2024-08-06 RX ORDER — ALPRAZOLAM 1 MG/1
1 TABLET ORAL 2 TIMES DAILY PRN
Qty: 60 TABLET | Refills: 0 | Status: SHIPPED | OUTPATIENT
Start: 2024-08-06

## 2024-08-06 RX ORDER — TRAMADOL HYDROCHLORIDE 50 MG/1
50 TABLET ORAL EVERY 6 HOURS PRN
Qty: 120 TABLET | Refills: 0 | OUTPATIENT
Start: 2024-08-06

## 2024-08-06 NOTE — TELEPHONE ENCOUNTER
Please review.  Protocol failed / Has no protocol.  Marked High Priority, patient states out of medication    Tramadol 50mg Recent fills each quantity 120 : 4/9, 5/7, 6/7, 7/5  DUE 8/8/24  Last prescription written: 7/5/24  Last office visit: 3/25/24    Alprazolam 1mg Recent fills each quantity 60 : 4/9, 5/7, 6/7, 7/5  DUE 8/8/24  Last prescription written: 7/5/24      Requested Prescriptions   Pending Prescriptions Disp Refills    traMADol 50 MG Oral Tab 120 tablet 0     Sig: Take 1 tablet (50 mg total) by mouth every 6 (six) hours as needed for Pain.       Controlled Substance Medication Failed - 8/6/2024  9:20 AM        Failed - This medication is a controlled substance - forward to provider to refill          ALPRAZolam 1 MG Oral Tab 60 tablet 0     Sig: Take 1 tablet (1 mg total) by mouth 2 (two) times daily as needed for Anxiety.       Controlled Substance Medication Failed - 8/6/2024  9:20 AM        Failed - This medication is a controlled substance - forward to provider to refill             Recent Outpatient Visits              4 months ago Anxiety    [facility] Mitra Herrera Nathaniel, MD    Office Visit    1 year ago Acute pain of right shoulder    [facility] Mitra Herrera Nathaniel, MD    Office Visit    2 years ago Other fatigue    [facility] Jennifer, APR[facility]    Office Visit    2 years ago Acute bilateral low back pain with sciatica, sciatica laterality unspecified    [facility] Jennifer, APR[facility]    Office Visit    2 years ago Chronic back pain, unspecified back location, unspecified back pain laterality    [facility] Mitra Herrera Nathaniel, MD    Office Visit

## 2024-08-06 NOTE — TELEPHONE ENCOUNTER
Message noted: Chart reviewed and may refill medications as requested. Script sent to listed pharmacy by secure method.    Pt notified through [employer]

## 2024-08-06 NOTE — TELEPHONE ENCOUNTER
Patient mom calling on behalf of patient - on MILA   Patient sent request 8/2/24 - advised of need for full 3 day business day turn around time     Patient out of meds and asking if this can please be reviewed and sent today

## 2024-08-09 RX ORDER — TRAMADOL HYDROCHLORIDE 50 MG/1
50 TABLET ORAL EVERY 6 HOURS PRN
Qty: 120 TABLET | Refills: 0 | OUTPATIENT
Start: 2024-08-09

## 2024-08-09 RX ORDER — ALPRAZOLAM 1 MG/1
1 TABLET ORAL 2 TIMES DAILY PRN
Qty: 60 TABLET | Refills: 0 | OUTPATIENT
Start: 2024-08-09

## 2024-08-27 DIAGNOSIS — F41.9 ANXIETY: ICD-10-CM

## 2024-08-27 DIAGNOSIS — M54.40 ACUTE BILATERAL LOW BACK PAIN WITH SCIATICA, SCIATICA LATERALITY UNSPECIFIED: ICD-10-CM

## 2024-08-27 RX ORDER — ALPRAZOLAM 1 MG
1 TABLET ORAL 2 TIMES DAILY PRN
Qty: 60 TABLET | Refills: 0 | Status: SHIPPED | OUTPATIENT
Start: 2024-08-27

## 2024-08-27 RX ORDER — TRAMADOL HYDROCHLORIDE 50 MG/1
50 TABLET ORAL EVERY 6 HOURS PRN
Qty: 120 TABLET | Refills: 0 | Status: SHIPPED | OUTPATIENT
Start: 2024-08-27

## 2024-08-27 NOTE — TELEPHONE ENCOUNTER
Message noted: Chart reviewed and may refill medication as requested. Script sent to listed pharmacy by secure method.    Pt notified through [employer]

## 2024-08-27 NOTE — TELEPHONE ENCOUNTER
Patients mother called and is asking if patient can get refills sooner on medication, patient left bag at hotel due to work       Recent Fills: 06/07/2024, 07/05/2024, 08/06/2024    Last Rx Written: 08/06/2024    Last Office Visit: 03/25/2024

## 2024-08-27 NOTE — TELEPHONE ENCOUNTER
Patients mother called and is asking if patient can get refills sooner on medication, patient left bag at hotel due to work    [id number] [place] [facility] [address] [place] [address]          Medication Detail    Medication Quantity Refills Start End   traMADol 50 MG Oral Tab 120 tablet 0 8/6/2024 --   Sig:   Take 1 tablet (50 mg total) by mouth every 6 (six) hours as needed for Pain.     Route:   Oral     PRN Reason(s):   Pain     Earliest Fill Date:   8/6/2024     Order #:   [id number]       Medication Detail    Medication Quantity Refills Start End   ALPRAZolam 1 MG Oral Tab 60 tablet 0 8/6/2024 --   Sig:   Take 1 tablet (1 mg total) by mouth 2 (two) times daily as needed for Anxiety.     Route:   Oral     PRN Reason(s):   Anxiety     Earliest Fill Date:   8/6/2024     Order #:   [id number]

## 2024-08-28 ENCOUNTER — TELEPHONE (OUTPATIENT)
Dept: FAMILY MEDICINE CLINIC | Facility: CLINIC | Age: 34
End: 2024-08-28

## 2024-08-28 NOTE — TELEPHONE ENCOUNTER
Mother called for patient, stating pharmacy needs the provider to call to approve early refill. Patient will be out of medication by tomorrow 8/29.    Medications were approved yesterday     Current Outpatient Medications   Medication Sig Dispense Refill    ALPRAZolam 1 MG Oral Tab Take 1 tablet (1 mg total) by mouth 2 (two) times daily as needed for Anxiety. 60 tablet 0    traMADol 50 MG Oral Tab Take 1 tablet (50 mg total) by mouth every 6 (six) hours as needed for Pain. 120 tablet 0

## 2024-08-28 NOTE — TELEPHONE ENCOUNTER
Called [facility] and advised of Dr Melgar's note. Valentina verbalized understanding. Mayuri-mom(on HIPAA) advised and will let patient know.

## 2024-08-28 NOTE — TELEPHONE ENCOUNTER
Dr Melgar: please review.   PRESCRIPTION for tramadol and alprazolam sent yesterday.     Mother calling if this can be refilled early.  Do you approve?    Both prescriptions Last refilled on 8/6/24.

## 2024-09-23 DIAGNOSIS — F41.9 ANXIETY: ICD-10-CM

## 2024-09-23 DIAGNOSIS — M54.40 ACUTE BILATERAL LOW BACK PAIN WITH SCIATICA, SCIATICA LATERALITY UNSPECIFIED: ICD-10-CM

## 2024-09-24 DIAGNOSIS — F41.9 ANXIETY: ICD-10-CM

## 2024-09-24 DIAGNOSIS — M54.40 ACUTE BILATERAL LOW BACK PAIN WITH SCIATICA, SCIATICA LATERALITY UNSPECIFIED: ICD-10-CM

## 2024-09-24 RX ORDER — TRAMADOL HYDROCHLORIDE 50 MG/1
50 TABLET ORAL EVERY 6 HOURS PRN
Qty: 120 TABLET | Refills: 0 | OUTPATIENT
Start: 2024-09-24

## 2024-09-24 RX ORDER — ALPRAZOLAM 1 MG
1 TABLET ORAL 2 TIMES DAILY PRN
Qty: 60 TABLET | Refills: 0 | OUTPATIENT
Start: 2024-09-24

## 2024-09-24 NOTE — TELEPHONE ENCOUNTER
Patients mom called to follow-up on these refill request on the patients behalf. Before I can inform her that the request is pending the call was disconnected.

## 2024-09-24 NOTE — TELEPHONE ENCOUNTER
Patients mother called back and said patient only has one day left.       [id number] [address]

## 2024-09-24 NOTE — TELEPHONE ENCOUNTER
Please review. Protocol Failed; No Protocol      Recent fills: 7/5/2024, 8/6/2024, 8/28/2024  Last Rx written: 8/27/2024  Last office visit: 3/25/2024          Requested Prescriptions   Pending Prescriptions Disp Refills    ALPRAZolam 1 MG Oral Tab 60 tablet 0     Sig: Take 1 tablet (1 mg total) by mouth 2 (two) times daily as needed for Anxiety.       Controlled Substance Medication Failed - 9/24/2024  4:31 PM        Failed - This medication is a controlled substance - forward to provider to refill          traMADol 50 MG Oral Tab 120 tablet 0     Sig: Take 1 tablet (50 mg total) by mouth every 6 (six) hours as needed for Pain.       Controlled Substance Medication Failed - 9/24/2024  4:31 PM        Failed - This medication is a controlled substance - forward to provider to refill                 Recent Outpatient Visits              6 months ago Anxiety    [facility]    Office Visit    1 year ago Acute pain of right shoulder    [facility]    Office Visit    2 years ago Other fatigue    [facility] Her[facility] Jennifer, APRN    Office Visit    2 years ago Acute bilateral low back pain with sciatica, sciatica laterality unspecified    [facility] Herrera Jennifer, APRN    Office Visit    2 years ago Chronic back pain, unspecified back location, unspecified back pain laterality    [facility] Herrera Nathaniel, MD    Office Visit

## 2024-09-25 RX ORDER — ALPRAZOLAM 1 MG
1 TABLET ORAL 2 TIMES DAILY PRN
Qty: 60 TABLET | Refills: 0 | Status: SHIPPED | OUTPATIENT
Start: 2024-09-25

## 2024-09-25 RX ORDER — TRAMADOL HYDROCHLORIDE 50 MG/1
50 TABLET ORAL EVERY 6 HOURS PRN
Qty: 120 TABLET | Refills: 0 | Status: SHIPPED | OUTPATIENT
Start: 2024-09-25

## 2024-09-25 NOTE — TELEPHONE ENCOUNTER
Message noted: Chart reviewed and may refill medication as requested. Script sent to listed pharmacy by secure method.    Pt notified through [employer]

## 2024-10-22 DIAGNOSIS — F41.9 ANXIETY: ICD-10-CM

## 2024-10-22 DIAGNOSIS — M54.40 ACUTE BILATERAL LOW BACK PAIN WITH SCIATICA, SCIATICA LATERALITY UNSPECIFIED: ICD-10-CM

## 2024-10-24 DIAGNOSIS — M54.40 ACUTE BILATERAL LOW BACK PAIN WITH SCIATICA, SCIATICA LATERALITY UNSPECIFIED: ICD-10-CM

## 2024-10-24 DIAGNOSIS — F41.9 ANXIETY: ICD-10-CM

## 2024-10-24 RX ORDER — TRAMADOL HYDROCHLORIDE 50 MG/1
50 TABLET ORAL EVERY 6 HOURS PRN
Qty: 120 TABLET | Refills: 0 | Status: SHIPPED | OUTPATIENT
Start: 2024-10-24

## 2024-10-24 RX ORDER — ALPRAZOLAM 1 MG/1
1 TABLET ORAL 2 TIMES DAILY PRN
Qty: 60 TABLET | Refills: 0 | Status: SHIPPED | OUTPATIENT
Start: 2024-10-24

## 2024-10-24 NOTE — TELEPHONE ENCOUNTER
Please review; protocol failed/No Protocol    Recent Fills:   Alprazolam: 08/06/2024, 08/28/2024, 09/25/2024-quantity 60 for 30 day supply- due 11/04/2024 based on day supply and dispense dates  Tramadol: 08/06/2024, 08/28/2024, 09/25/2024-quantity 120 for 30 day supply-due 11/04/2024 based on day supply and dispense dates.    Last Rx Written:   Alprazolam: 09/25/2024  Tramadol: 09/25/2024    Last Office Visit: 03/25/2024    Requested Prescriptions   Pending Prescriptions Disp Refills    ALPRAZolam 1 MG Oral Tab 60 tablet 0     Sig: Take 1 tablet (1 mg total) by mouth 2 (two) times daily as needed for Anxiety.       Controlled Substance Medication Failed - 10/24/2024 10:25 AM        Failed - This medication is a controlled substance - forward to provider to refill          traMADol 50 MG Oral Tab 120 tablet 0     Sig: Take 1 tablet (50 mg total) by mouth every 6 (six) hours as needed for Pain.       Controlled Substance Medication Failed - 10/24/2024 10:25 AM        Failed - This medication is a controlled substance - forward to provider to refill             Recent Outpatient Visits              7 months ago An[facility]    Office Visit    1 year ago Acute pain of right shoulder    [facility]    Office Visit    2 years ago Other fatigue    [facility] Herrera Jennifer, APRN    Office Visit    2 years ago Acute bilateral low back pain with sciatica, sciatica laterality unspecified    [facility] Herrera Jennifer, APRN    Office Visit    3 years ago Chronic back pain, unspecified back location, unspecified back pain laterality    [facility]    Office Visit

## 2024-10-24 NOTE — TELEPHONE ENCOUNTER
Message noted: Chart reviewed and may refill medication as requested. Script sent to listed pharmacy by secure method.    Pt notified through [employer]

## 2024-10-26 RX ORDER — ALPRAZOLAM 1 MG/1
1 TABLET ORAL 2 TIMES DAILY PRN
Qty: 60 TABLET | Refills: 0 | OUTPATIENT
Start: 2024-10-26

## 2024-10-26 RX ORDER — TRAMADOL HYDROCHLORIDE 50 MG/1
50 TABLET ORAL EVERY 6 HOURS PRN
Qty: 120 TABLET | Refills: 0 | OUTPATIENT
Start: 2024-10-26

## 2024-11-21 DIAGNOSIS — F41.9 ANXIETY: ICD-10-CM

## 2024-11-21 DIAGNOSIS — M54.40 ACUTE BILATERAL LOW BACK PAIN WITH SCIATICA, SCIATICA LATERALITY UNSPECIFIED: ICD-10-CM

## 2024-11-21 RX ORDER — ALPRAZOLAM 1 MG/1
1 TABLET ORAL 2 TIMES DAILY PRN
Qty: 60 TABLET | Refills: 0 | Status: SHIPPED | OUTPATIENT
Start: 2024-11-21

## 2024-11-21 RX ORDER — TRAMADOL HYDROCHLORIDE 50 MG/1
50 TABLET ORAL EVERY 6 HOURS PRN
Qty: 120 TABLET | Refills: 0 | Status: SHIPPED | OUTPATIENT
Start: 2024-11-21

## 2024-11-21 NOTE — TELEPHONE ENCOUNTER
Message noted: Chart reviewed and may refill medications as requested. Script sent to listed pharmacy by secure method.    Pt notified through [employer]  Naloxone sent.

## 2024-11-23 NOTE — TELEPHONE ENCOUNTER
Message noted. Chart reviewed. May refill medication as requested. Script reviewed and signed. Please call into pharmacy as this is controlled substance and notify patient. Thanks. 97.7

## 2024-12-17 DIAGNOSIS — M54.40 ACUTE BILATERAL LOW BACK PAIN WITH SCIATICA, SCIATICA LATERALITY UNSPECIFIED: ICD-10-CM

## 2024-12-17 DIAGNOSIS — F41.9 ANXIETY: ICD-10-CM

## 2024-12-18 DIAGNOSIS — M54.40 ACUTE BILATERAL LOW BACK PAIN WITH SCIATICA, SCIATICA LATERALITY UNSPECIFIED: ICD-10-CM

## 2024-12-18 DIAGNOSIS — F41.9 ANXIETY: ICD-10-CM

## 2024-12-18 RX ORDER — TRAMADOL HYDROCHLORIDE 50 MG/1
50 TABLET ORAL EVERY 6 HOURS PRN
Qty: 120 TABLET | Refills: 0 | Status: SHIPPED | OUTPATIENT
Start: 2024-12-18

## 2024-12-18 RX ORDER — TRAMADOL HYDROCHLORIDE 50 MG/1
50 TABLET ORAL EVERY 6 HOURS PRN
Qty: 120 TABLET | Refills: 0 | OUTPATIENT
Start: 2024-12-18

## 2024-12-18 RX ORDER — ALPRAZOLAM 1 MG/1
1 TABLET ORAL 2 TIMES DAILY PRN
Qty: 60 TABLET | Refills: 0 | Status: SHIPPED | OUTPATIENT
Start: 2024-12-18

## 2024-12-18 RX ORDER — ALPRAZOLAM 1 MG/1
1 TABLET ORAL 2 TIMES DAILY PRN
Qty: 60 TABLET | Refills: 0 | OUTPATIENT
Start: 2024-12-18

## 2024-12-18 NOTE — TELEPHONE ENCOUNTER
Please review. Protocol Failed; No Protocol    Routing to podmates due to High Priority status and Dr. Melgar is out of office.      Mom called on behalf of patient stating he is leaving out of town [facility] 12/18 to [place] for work, and will be out of medication.      Could this please be expedited.      ALPRAZolam 1 MG :     Recent fills: 9/25/2024, 10/24/2024, 11/21/2024  Last Rx written: 11/21/2024  Last office visit: 3/25/2024    traMADol 50 MG:    Recent fills: 9/25/2024, 10/24/2024, 11/21/2024  Last Rx written: 11/21/2024  Last office visit: 3/25/2024      Requested Prescriptions   Pending Prescriptions Disp Refills    ALPRAZolam 1 MG Oral Tab 60 tablet 0     Sig: Take 1 tablet (1 mg total) by mouth 2 (two) times daily as needed for Anxiety.       Controlled Substance Medication Failed - 12/18/2024 12:23 PM        Failed - This medication is a controlled substance - forward to provider to refill          traMADol 50 MG Oral Tab 120 tablet 0     Sig: Take 1 tablet (50 mg total) by mouth every 6 (six) hours as needed for Pain.       Controlled Substance Medication Failed - 12/18/2024 12:23 PM        Failed - This medication is a controlled substance - forward to provider to refill                 Recent Outpatient Visits              8 months ago Anxiety    [facility]    Office Visit    1 year ago Acute pain of right shoulder    [facility]    Office Visit    2 years ago Other fatigue    [facility]    Office Visit    2 years ago Acute bilateral low back pain with sciatica, sciatica laterality unspecified    [facility]    Office Visit    3 years ago Chronic back pain, unspecified back location, unspecified back pain laterality    [facility]    Office Visit

## 2024-12-18 NOTE — TELEPHONE ENCOUNTER
Mom called on behalf of patient stating he is leaving out of town [facility] 12/18 to [place] for work, and will be out of medication.     Could this please be expedited.

## 2025-01-14 DIAGNOSIS — M54.40 ACUTE BILATERAL LOW BACK PAIN WITH SCIATICA, SCIATICA LATERALITY UNSPECIFIED: ICD-10-CM

## 2025-01-14 DIAGNOSIS — F41.9 ANXIETY: ICD-10-CM

## 2025-01-14 RX ORDER — ALPRAZOLAM 1 MG/1
1 TABLET ORAL 2 TIMES DAILY PRN
Qty: 60 TABLET | Refills: 0 | Status: SHIPPED | OUTPATIENT
Start: 2025-01-14

## 2025-01-14 RX ORDER — TRAMADOL HYDROCHLORIDE 50 MG/1
50 TABLET ORAL EVERY 6 HOURS PRN
Qty: 120 TABLET | Refills: 0 | Status: SHIPPED | OUTPATIENT
Start: 2025-01-14

## 2025-01-14 NOTE — TELEPHONE ENCOUNTER
Message noted: Chart reviewed and may refill medications as requested. Script sent to listed pharmacy by secure method.    Pt notified through [employer]

## 2025-01-14 NOTE — TELEPHONE ENCOUNTER
Please review. Protocol Failed; No Protocol      Recent fills: 11/21/2024, 12/18/2024    Last Rx written: 12/18/2024  DUE 1/18/2025  Last office visit: 3/25/2024        Requested Prescriptions   Pending Prescriptions Disp Refills    ALPRAZolam 1 MG Oral Tab 60 tablet 0     Sig: Take 1 tablet (1 mg total) by mouth 2 (two) times daily as needed for Anxiety.       Controlled Substance Medication Failed - 1/14/2025  3:50 PM        Failed - This medication is a controlled substance - forward to provider to refill        Passed - Medication is active on med list          traMADol 50 MG Oral Tab 120 tablet 0     Sig: Take 1 tablet (50 mg total) by mouth every 6 (six) hours as needed for Pain.       Controlled Substance Medication Failed - 1/14/2025  3:50 PM        Failed - This medication is a controlled substance - forward to provider to refill        Passed - Medication is active on med list                 Recent Outpatient Visits              9 months ago Anxiety    [facility] Her[facility] Nathaniel, MD    Office Visit    1 year ago Acute pain of right shoulder    [facility]    Office Visit    2 years ago Other fatigue    [facility] Jennifer, APR[facility]    Office Visit    2 years ago Acute bilateral low back pain with sciatica, sciatica laterality unspecified    [facility], [facility]    Office Visit    3 years ago Chronic back pain, unspecified back location, unspecified back pain laterality    [facility] Herrera Nathaniel, MD    Office Visit

## 2025-01-14 NOTE — TELEPHONE ENCOUNTER
Patient's mother called on behalf of the patient for an update on the medications, as the patient will be out of the medications tomorrow.

## 2025-02-10 DIAGNOSIS — M54.40 ACUTE BILATERAL LOW BACK PAIN WITH SCIATICA, SCIATICA LATERALITY UNSPECIFIED: ICD-10-CM

## 2025-02-10 DIAGNOSIS — F41.9 ANXIETY: ICD-10-CM

## 2025-02-10 RX ORDER — TRAMADOL HYDROCHLORIDE 50 MG/1
50 TABLET ORAL EVERY 6 HOURS PRN
Qty: 120 TABLET | Refills: 0 | OUTPATIENT
Start: 2025-02-10

## 2025-02-10 RX ORDER — ALPRAZOLAM 1 MG/1
1 TABLET ORAL 2 TIMES DAILY PRN
Qty: 60 TABLET | Refills: 0 | OUTPATIENT
Start: 2025-02-10

## 2025-02-10 RX ORDER — TRAMADOL HYDROCHLORIDE 50 MG/1
50 TABLET ORAL EVERY 6 HOURS PRN
Qty: 120 TABLET | Refills: 0 | Status: SHIPPED | OUTPATIENT
Start: 2025-02-10

## 2025-02-10 RX ORDER — ALPRAZOLAM 1 MG/1
1 TABLET ORAL 2 TIMES DAILY PRN
Qty: 60 TABLET | Refills: 0 | Status: SHIPPED | OUTPATIENT
Start: 2025-02-10

## 2025-02-10 NOTE — TELEPHONE ENCOUNTER
Patient's mother Mayuri called (on Release of Information), verified patient's Name and . Following up on refill of alprazolam and tramadol. [facility] patient received a [employer] message that refill is not due until . Patient will be out of medications by then. [facility] last refill was picked up on .    Called pharmacy to confirm when the patient is due for refill and this RN was told refill is due tomorrow . Patient's mother updated.    Dr. Melgar please advise.

## 2025-02-10 NOTE — TELEPHONE ENCOUNTER
Message noted: Chart reviewed and may refill medication as requested. Script sent to listed pharmacy by secure method.    Pt notified through [employer]

## 2025-02-10 NOTE — TELEPHONE ENCOUNTER
Pt's mother calling for pt , pt at work, advised need appt , LOV March 2024, verbalized understanding   Last refill 1/14/25

## 2025-02-10 NOTE — TELEPHONE ENCOUNTER
Patient is requesting 15 days early    Alprazolam 1mg Recent fills each # 60 : 8/28, 9/25, 10/24, 11/21, 12/18, 1/14  Last prescription written: 1/14/25  Last office visit:  3/25/2024    No future appointments.       Tramadol Recent fills each # 120 : 8/28, 9/25, 10/24, 11/21, 12/18, 1/14  Last prescription written: 1/14/25  Last office visit:  3/25/2024    Requested Prescriptions   Pending Prescriptions Disp Refills    ALPRAZolam 1 MG Oral Tab 60 tablet 0     Sig: Take 1 tablet (1 mg total) by mouth 2 (two) times daily as needed for Anxiety.       Controlled Substance Medication Failed - 2/10/2025 12:19 PM        Failed - This medication is a controlled substance - forward to provider to refill        Passed - Medication is active on med list          traMADol 50 MG Oral Tab 120 tablet 0     Sig: Take 1 tablet (50 mg total) by mouth every 6 (six) hours as needed for Pain.       Controlled Substance Medication Failed - 2/10/2025 12:19 PM        Failed - This medication is a controlled substance - forward to provider to refill        Passed - Medication is active on med list

## 2025-03-06 DIAGNOSIS — M54.40 ACUTE BILATERAL LOW BACK PAIN WITH SCIATICA, SCIATICA LATERALITY UNSPECIFIED: ICD-10-CM

## 2025-03-06 DIAGNOSIS — F41.9 ANXIETY: ICD-10-CM

## 2025-03-06 RX ORDER — TRAMADOL HYDROCHLORIDE 50 MG/1
50 TABLET ORAL EVERY 6 HOURS PRN
Qty: 120 TABLET | Refills: 0 | Status: SHIPPED | OUTPATIENT
Start: 2025-03-06

## 2025-03-06 RX ORDER — ALPRAZOLAM 1 MG/1
1 TABLET ORAL 2 TIMES DAILY PRN
Qty: 60 TABLET | Refills: 0 | Status: SHIPPED | OUTPATIENT
Start: 2025-03-06

## 2025-03-06 NOTE — TELEPHONE ENCOUNTER
Message noted: Chart reviewed and may refill medication as requested. Script sent to listed pharmacy by secure method.    Pt notified through [employer]

## 2025-03-07 ENCOUNTER — TELEPHONE (OUTPATIENT)
Dept: FAMILY MEDICINE CLINIC | Facility: CLINIC | Age: 35
End: 2025-03-07

## 2025-03-07 NOTE — TELEPHONE ENCOUNTER
Spoke with pharmacist and advised per APN ok for early refill.     Patient's mother advised ok for early refill.  Also advised patient is due for office visit  Per mother she will have patient schedule an office visit.

## 2025-03-07 NOTE — TELEPHONE ENCOUNTER
Reviewed [place]-, script last filled on 2/10/25  Please call pharmacy and advise okay to dispense medication early

## 2025-03-07 NOTE — TELEPHONE ENCOUNTER
Patient's mother Mayuri (on MILA) calling to state patient will be going out of town on 03/08/25, but cannot  scripts for tramadol and alprazolam until 03/10/25. Asking for early release of medication in order to be able to  before patient goes out of town.

## 2025-04-02 ENCOUNTER — OFFICE VISIT (OUTPATIENT)
Dept: FAMILY MEDICINE CLINIC | Facility: CLINIC | Age: 35
End: 2025-04-02

## 2025-04-02 VITALS
DIASTOLIC BLOOD PRESSURE: 67 MMHG | WEIGHT: 172 LBS | BODY MASS INDEX: 20.95 KG/M2 | RESPIRATION RATE: 20 BRPM | HEIGHT: 76 IN | TEMPERATURE: 99 F | SYSTOLIC BLOOD PRESSURE: 107 MMHG | HEART RATE: 84 BPM

## 2025-04-02 DIAGNOSIS — M54.40 ACUTE BILATERAL LOW BACK PAIN WITH SCIATICA, SCIATICA LATERALITY UNSPECIFIED: ICD-10-CM

## 2025-04-02 DIAGNOSIS — F41.9 ANXIETY: ICD-10-CM

## 2025-04-02 DIAGNOSIS — M54.41 CHRONIC RIGHT-SIDED LOW BACK PAIN WITH RIGHT-SIDED SCIATICA: ICD-10-CM

## 2025-04-02 DIAGNOSIS — M25.561 ACUTE PAIN OF RIGHT KNEE: Primary | ICD-10-CM

## 2025-04-02 DIAGNOSIS — G89.29 CHRONIC RIGHT-SIDED LOW BACK PAIN WITH RIGHT-SIDED SCIATICA: ICD-10-CM

## 2025-04-02 PROCEDURE — 99213 OFFICE O/P EST LOW 20 MIN: CPT | Performed by: FAMILY MEDICINE

## 2025-04-02 RX ORDER — ALPRAZOLAM 1 MG/1
1 TABLET ORAL 2 TIMES DAILY PRN
Qty: 60 TABLET | Refills: 0 | Status: SHIPPED | OUTPATIENT
Start: 2025-04-02

## 2025-04-02 RX ORDER — TRAMADOL HYDROCHLORIDE 50 MG/1
50 TABLET ORAL EVERY 6 HOURS PRN
Qty: 120 TABLET | Refills: 0 | Status: SHIPPED | OUTPATIENT
Start: 2025-04-02

## 2025-04-02 NOTE — PROGRESS NOTES
Subjective:   Patient ID: Alysha Richards is a 34 year old male.    Patient is here for follow up for chronic medical issues- chronic knee pain and back pains and anxiety. The patient is compliant with medications and no side effects. The patient states medications have been helpful and effective. Will need refills   Past hx of meniscal tear and surgery.   Hi ALYSHA,    I got your message.   I sent your prescription you requested to your pharmacy.      Dr. Melgar  Today, I Gwyn Melgar MD reviewed the Patient Provider Pain Agreement (PPPA) with Alysha Richards on 04/02/25. We discussed the goals of chronic pain management, risks of chronic opioid therapy, and conditions for continued treatment.  Pain Agreement            History/Other:   Review of Systems  Current Outpatient Medications   Medication Sig Dispense Refill    traMADol 50 MG Oral Tab Take 1 tablet (50 mg total) by mouth every 6 (six) hours as needed for Pain. 120 tablet 0    ALPRAZolam 1 MG Oral Tab Take 1 tablet (1 mg total) by mouth 2 (two) times daily as needed for Anxiety. 60 tablet 0    Naloxone HCl 4 MG/0.1ML Nasal Liquid 4 mg by Intranasal route as needed (May repeat as needed in other nostril if symptoms persist). If patient remains unresponsive, repeat dose in other nostril 2-5 minutes after first dose. 1 kit 0     Allergies:Allergies[1]    Objective:   Physical Exam    Assessment & Plan:   1. Acute pain of right knee: hx of meniscal surgery  - After discussion, will send to orthopedics for further evaluation and treatment; To call if any significant symptoms.      2. Chronic right-sided low back pain with right-sided sciatica /   3. Acute bilateral low back pain with sciatica, sciatica laterality unspecified:  - Stable: medication/ pain management contract reviewed and renewed; To continue present treatment; To call if problems; Also discussed weaning and reducing medication.. Routine follow up in 3-6 months.     4. Anxiety:  -  Stable: medication reviewed and renewed; To continue present treatment and to use as needed. To call if problems; Routine follow up in 6-12 months.         No orders of the defined types were placed in this encounter.      Meds This Visit:  Requested Prescriptions     Signed Prescriptions Disp Refills    traMADol 50 MG Oral Tab 120 tablet 0     Sig: Take 1 tablet (50 mg total) by mouth every 6 (six) hours as needed for Pain.    ALPRAZolam 1 MG Oral Tab 60 tablet 0     Sig: Take 1 tablet (1 mg total) by mouth 2 (two) times daily as needed for Anxiety.       Imaging & Referrals:  ORTHOPEDIC - INTERNAL         [1]   Allergies  Allergen Reactions    Amoxicillin RASH

## 2025-04-29 DIAGNOSIS — M54.40 ACUTE BILATERAL LOW BACK PAIN WITH SCIATICA, SCIATICA LATERALITY UNSPECIFIED: ICD-10-CM

## 2025-04-29 DIAGNOSIS — F41.9 ANXIETY: ICD-10-CM

## 2025-04-29 RX ORDER — TRAMADOL HYDROCHLORIDE 50 MG/1
50 TABLET ORAL EVERY 6 HOURS PRN
Qty: 120 TABLET | Refills: 0 | Status: SHIPPED | OUTPATIENT
Start: 2025-04-29

## 2025-04-29 RX ORDER — ALPRAZOLAM 1 MG/1
1 TABLET ORAL 2 TIMES DAILY PRN
Qty: 60 TABLET | Refills: 0 | Status: SHIPPED | OUTPATIENT
Start: 2025-04-29

## 2025-04-29 NOTE — TELEPHONE ENCOUNTER
Message noted: Chart reviewed and may refill medication as requested. Script sent to listed pharmacy by secure method.    Pt notified through [employer]

## 2025-05-27 DIAGNOSIS — M54.40 ACUTE BILATERAL LOW BACK PAIN WITH SCIATICA, SCIATICA LATERALITY UNSPECIFIED: ICD-10-CM

## 2025-05-27 DIAGNOSIS — F41.9 ANXIETY: ICD-10-CM

## 2025-05-27 RX ORDER — TRAMADOL HYDROCHLORIDE 50 MG/1
50 TABLET ORAL EVERY 6 HOURS PRN
Qty: 120 TABLET | Refills: 0 | Status: SHIPPED | OUTPATIENT
Start: 2025-05-27

## 2025-05-27 RX ORDER — ALPRAZOLAM 1 MG/1
1 TABLET ORAL 2 TIMES DAILY PRN
Qty: 60 TABLET | Refills: 0 | Status: SHIPPED | OUTPATIENT
Start: 2025-05-27

## 2025-05-27 NOTE — TELEPHONE ENCOUNTER
Message noted: Chart reviewed and may refill medication as requested. Script sent to listed pharmacy by secure method.    Pt notified through [employer]

## 2025-05-27 NOTE — TELEPHONE ENCOUNTER
Please review; protocol failed/No Protocol      Recent Fills:   Alprazolam: 03/07/2025, 04/03/2025, 04/30/2025  Tramadol: 03/07/2025, 04/03/2025, 04/30/2025    Last Rx Written:   Alprazolam: 04/29/2025  Tramadol: 04/29/2025    Last Office Visit: 04/02/2025

## 2025-06-23 DIAGNOSIS — F41.9 ANXIETY: ICD-10-CM

## 2025-06-23 DIAGNOSIS — M54.40 ACUTE BILATERAL LOW BACK PAIN WITH SCIATICA, SCIATICA LATERALITY UNSPECIFIED: ICD-10-CM

## 2025-06-23 RX ORDER — TRAMADOL HYDROCHLORIDE 50 MG/1
50 TABLET ORAL EVERY 6 HOURS PRN
Qty: 120 TABLET | Refills: 0 | Status: SHIPPED | OUTPATIENT
Start: 2025-06-23

## 2025-06-23 RX ORDER — ALPRAZOLAM 1 MG/1
1 TABLET ORAL 2 TIMES DAILY PRN
Qty: 60 TABLET | Refills: 0 | Status: SHIPPED | OUTPATIENT
Start: 2025-06-23

## 2025-06-23 NOTE — TELEPHONE ENCOUNTER
Message noted: Chart reviewed and may refill medication as requested. Script sent to listed pharmacy by secure method.    Pt notified through [employer]

## 2025-07-16 DIAGNOSIS — F41.9 ANXIETY: ICD-10-CM

## 2025-07-16 DIAGNOSIS — M54.40 ACUTE BILATERAL LOW BACK PAIN WITH SCIATICA, SCIATICA LATERALITY UNSPECIFIED: ICD-10-CM

## 2025-07-16 RX ORDER — TRAMADOL HYDROCHLORIDE 50 MG/1
50 TABLET ORAL EVERY 6 HOURS PRN
Qty: 120 TABLET | Refills: 0 | Status: SHIPPED | OUTPATIENT
Start: 2025-07-16

## 2025-07-16 RX ORDER — ALPRAZOLAM 1 MG/1
1 TABLET ORAL 2 TIMES DAILY PRN
Qty: 60 TABLET | Refills: 0 | Status: SHIPPED | OUTPATIENT
Start: 2025-07-16

## 2025-07-16 NOTE — TELEPHONE ENCOUNTER
Mom states that she for got to mention that the Patient also forgot the Tramadol medication in the Hotel in [place], so patient will need to get a new prescription sent to [facility] for a early  of the Tramadol.

## 2025-07-16 NOTE — TELEPHONE ENCOUNTER
For replies, please route to pool: Health system CENTRAL REFILLS    Please review: medication fails/has no protocol attached.  Medication request is marked high priority: Patient forgot his medication in a hotel while out of state for work and is asking for an early refill - patient states out of medication.    No future appointments with primary care medicine     Recent fill dates: 6/23/25, 5/27/25, 4/30/25, and 4/3/25  Date of  last written prescription: 6/23/25   Last dispensed quantity: #60 each and processed as a 30 day supply

## 2025-07-16 NOTE — TELEPHONE ENCOUNTER
Message noted: Chart reviewed and may refill medication as requested. Script sent to listed pharmacy by secure method.    Pt notified through [employer]

## 2025-07-16 NOTE — TELEPHONE ENCOUNTER
Message noted: Chart reviewed and may refill medication as requested. Script sent to listed pharmacy by secure method.    Pt notified through [employer]

## 2025-07-16 NOTE — TELEPHONE ENCOUNTER
Patient forgot his medication in a hotel while out of state for work and is asking for an early refill on:    ALPRAZolam 1 MG Oral Tab       [id number] [place] [facility] [place] [facility] [address] [phone], [phone]        Patient is out of the medication.

## 2025-07-17 NOTE — TELEPHONE ENCOUNTER
Mother called regarding tramadol.  Early refill was not indicated on this, only the alprazolam.  Pharmacy contacted and notified.

## 2025-08-13 DIAGNOSIS — F41.9 ANXIETY: ICD-10-CM

## 2025-08-13 DIAGNOSIS — M54.40 ACUTE BILATERAL LOW BACK PAIN WITH SCIATICA, SCIATICA LATERALITY UNSPECIFIED: ICD-10-CM

## 2025-08-13 RX ORDER — TRAMADOL HYDROCHLORIDE 50 MG/1
50 TABLET ORAL EVERY 6 HOURS PRN
Qty: 120 TABLET | Refills: 0 | Status: SHIPPED | OUTPATIENT
Start: 2025-08-13

## 2025-08-13 RX ORDER — ALPRAZOLAM 1 MG/1
1 TABLET ORAL 2 TIMES DAILY PRN
Qty: 60 TABLET | Refills: 0 | Status: SHIPPED | OUTPATIENT
Start: 2025-08-13

## (undated) NOTE — MR AVS SNAPSHOT
[place] [address] [address]  [phone]  [phone]               Thank you for choosing us for your health care visit with Elizabeth Jain PT.   We are glad to serve you and happy to provide you [phone]           Please arrive at your scheduled appointment time. Wear comfortable, loose fitting clothing.             May 19, 2017 12:30 PM   [place] [facility] Visit By Therapist with Baldomero Keller, [address] [facility]

## (undated) NOTE — MR AVS SNAPSHOT
[facility] [place] [address] [address]ivas[address] [address] [phone]               Thank you for choosing us for your health care visit with Piter Cooley MD.  We are glad to serve you and happy to provide you with this summary of y MRI SPINE LUMBAR (CPT=72148)    Complete by:  Jan 17, 2017 (Approximate)    Assoc Dx:  Dorsalgia of lumbar region [M54.5], Back injury, initial encounter [S39.92XA]                 Follow-up Instructions     Return if symptoms worsen or fail to improve.

## (undated) NOTE — MR AVS SNAPSHOT
[facility]  [address], [address] [facility]  [address] [facility] [phone]               Thank you for choosing us for your health care visit with Becki Salazar.  Marcia Olsen MD.  We are glad to serve you and happy to provide you with this summary of you Take one tablet (1mg) by mouth every evening as needed for anxiety. Commonly known as:  XANAX           Cyclobenzaprine HCl 10 MG Tabs   TAKE 1 TABLET (10 MG TOTAL) BY MOUTH NIGHTLY.    Commonly known as:  cyclobenzaprine           TraMADol HCl 50 MG Tabs If you have questions, please call your plan's customer service number located on your ID card. [facility] AND [facility] Physical Therapy Locations:    [facility], [facility] · [address] [place] [address] · [phone]    [facility]

## (undated) NOTE — MR AVS SNAPSHOT
[place] [address] [facility] [address] [facility] [address]  [phone]  [phone]               Thank you for choosing us for your health care visit with Elizabeth Arndt PT.   We are glad to serve you and happy to provide you [phone]           Please arrive at your scheduled appointment time. Wear comfortable, loose fitting clothing.             Jun 09, 2017 12:30 PM   [place] [facility] Visit By Therapist with Slick Lewis, [address] [facility]

## (undated) NOTE — MR AVS SNAPSHOT
[place] [address] [facility] [address] [facility] [address]  [phone]  [phone]               Thank you for choosing us for your health care visit with Elizabeth Esquivel PT.   We are glad to serve you and happy to provide you [phone]           Please arrive at your scheduled appointment time. Wear comfortable, loose fitting clothing.               [employer]     Call the [employer] for assistance with your inactive [employer] account    If you have questions, you can call [phone]

## (undated) NOTE — MR AVS SNAPSHOT
[facility] [address] [address]  [phone]  [phone]               Thank you for choosing us for your health care visit with Elizabeth Valverde PT.   We are glad to serve you and happy to provide you Apr 14, 2017  1:15 PM   [place] [facility] Visit By Therapist with Dede Ansari, [address] [facility] ([address] [facility])    [address]   [phone]           Please arrive at

## (undated) NOTE — LETTER
4/11/2022          To Whom It May Concern:    Sandra Richards is currently under my medical care and may not return to work at this time. Please excuse Melanie Tian for 1 days. He may return to work on 4/12/22. Activity is restricted as follows: light duty, no climbing, no lifting and no lifting over 20 lbs. May resume regular activity starting 4/18/22    If you require additional information please contact our office.         Sincerely,    KANDICE Dallas          Document generated by:  KANDICE Dallas

## (undated) NOTE — LETTER
12/04/21        [facility]  [address]      Dear Maya Aguilar,    Our records indicate that you have outstanding lab work and or testing that was ordered for you and has not yet been completed:  Orders Placed This Encounte

## (undated) NOTE — MR AVS SNAPSHOT
[facility] [address] [address]  [phone]  [phone]               Thank you for choosing us for your health care visit with Elizabeth Mahoney PT.   We are glad to serve you and happy to provide you [phone]           Please arrive at your scheduled appointment time. Wear comfortable, loose fitting clothing.             Apr 28, 2017 11:15 AM   [place] [facility] Visit By Therapist with Duarte Jimenez, [address] [facility]

## (undated) NOTE — MR AVS SNAPSHOT
[facility] [address] [address]  [phone]  [phone]               Thank you for choosing us for your health care visit with Elizabeth Humphrey PT.   We are glad to serve you and happy to provide you [phone]           Please arrive at your scheduled appointment time. Wear comfortable, loose fitting clothing.             May 05, 2017 12:30 PM   [place] [facility] Visit By Therapist with Janette Banegas,  [facility]

## (undated) NOTE — MR AVS SNAPSHOT
[facility] [address] [address]  [phone]               Thank you for choosing us for your health care visit with Elizabeth Jones PT.   We are glad to serve you and happy to provide you loose fitting clothing. Apr 14, 2017 12:30 PM   [place] [facility] Visit By Therapist with Nilesh Lam, [address] [facility] ([address] [facility])    [address]  [address]   [phone]

## (undated) NOTE — MR AVS SNAPSHOT
[facility] [address] [address]  [phone]               Thank you for choosing us for your health care visit with Elizabeth Dumont PT.   We are glad to serve you and happy to provide you [phone]           Please arrive at your scheduled appointment time. Wear comfortable, loose fitting clothing.             May 12, 2017 12:30 PM   [place] [facility] Visit By Therapist with Karina Chapman, [address] [facility]

## (undated) NOTE — MR AVS SNAPSHOT
[facility] [address] [address]  [phone]  [phone]               Thank you for choosing us for your health care visit with Elizabeth Lomax PT.   We are glad to serve you and happy to provide you [phone]           Please arrive at your scheduled appointment time. Wear comfortable, loose fitting clothing.             Jun 23, 2017 12:30 PM   [place] [facility] Visit By Therapist with Kamila Camp, [address] [facility]

## (undated) NOTE — MR AVS SNAPSHOT
[facility] [address] [address]  [phone]               Thank you for choosing us for your health care visit with Elizabeth Mahoney PT.   We are glad to serve you and happy to provide you

## (undated) NOTE — MR AVS SNAPSHOT
[place] [address] [address]  [phone]  [phone]               Thank you for choosing us for your health care visit with Elizabeth Kennedy PT.   We are glad to serve you and happy to provide you [phone]           Please arrive at your scheduled appointment time. Wear comfortable, loose fitting clothing.             Jun 02, 2017 12:30 PM   [place] [facility] Visit By Therapist with Mona Damon, [address] [place], [place] [address]ab Service

## (undated) NOTE — LETTER
6/24/2019              [address]         Dear Raysa Sotelo,    This letter is to inform you that our office has made several attempts to reach you by phone without success.   We were atte

## (undated) NOTE — MR AVS SNAPSHOT
[facility] [address] [address]  [phone]  [phone]               Thank you for choosing us for your health care visit with Elizabeth Joseph PT.   We are glad to serve you and happy to provide you [phone]            Apr 07, 2017  1:15 PM   [place] [facility] Visit By Therapist with Brian Curtis, [address] ([address] [facility] [address])    [address]  [place] [id number]   [phone]

## (undated) NOTE — Clinical Note
1/17/2017          To Whom It May Concern:    Muriel Richards is currently under my medical care. Please excuse the patient from work missed until further notice as he has had a back injury.   The patient is under going evaluation and will have follow

## (undated) NOTE — LETTER
Patient Name: Maine Boswell  : 1990  MRN: [id number]  Patient Address: [address] [place] [address]      Coronavirus Disease 2019 (COVID-19)     [facility] is committed to the safety and well-being of our patients, member carefully. If your symptoms get worse, call your healthcare provider immediately. 3. Get rest and stay hydrated.    4. If you have a medical appointment, call the healthcare provider ahead of time and tell them that you have or may have COVID-19.  5. For m of fever-reducing medications; and  · Improvement in respiratory symptoms (e.g., cough, shortness of breath); and  · At least 10 days have passed since symptoms first appeared OR if asymptomatic patient or date of symptom onset is unclear then use 10 days donors must:    · Have had a confirmed diagnosis of COVID-19  · Be symptom-free for at least 14 days*    *Some people will be required to have a repeat COVID-19 test in order to be eligible to donate.  If you’re instructed by [facility] that a repeat test is r random. Researchers are trying to identify similarities between people with a Post-COVID condition to better understand if there are risk factors. How do I prevent a Post-COVID condition?   The best way to prevent the long-term symptoms of COVID-19 is